# Patient Record
Sex: MALE | Race: WHITE | Employment: OTHER | URBAN - METROPOLITAN AREA
[De-identification: names, ages, dates, MRNs, and addresses within clinical notes are randomized per-mention and may not be internally consistent; named-entity substitution may affect disease eponyms.]

---

## 2018-12-06 ENCOUNTER — LAB ENCOUNTER (OUTPATIENT)
Dept: LAB | Facility: HOSPITAL | Age: 83
End: 2018-12-06
Payer: MEDICARE

## 2018-12-06 DIAGNOSIS — E78.00 HYPERCHOLESTEROLEMIA: ICD-10-CM

## 2018-12-06 DIAGNOSIS — G47.00 INSOMNIA: ICD-10-CM

## 2018-12-06 DIAGNOSIS — R41.3 MEMORY IMPAIRMENT: ICD-10-CM

## 2018-12-06 DIAGNOSIS — R73.9 HYPERGLYCEMIA: Primary | ICD-10-CM

## 2018-12-06 DIAGNOSIS — H91.90 HEARING LOSS: ICD-10-CM

## 2018-12-06 DIAGNOSIS — E03.9 HYPOTHYROIDISM: ICD-10-CM

## 2018-12-06 DIAGNOSIS — I10 HYPERTENSION: ICD-10-CM

## 2018-12-06 DIAGNOSIS — F03.90 DEMENTIA (HCC): ICD-10-CM

## 2018-12-06 DIAGNOSIS — E53.8 B12 DEFICIENCY: ICD-10-CM

## 2018-12-06 DIAGNOSIS — E55.9 VITAMIN D DEFICIENCY: ICD-10-CM

## 2018-12-06 PROCEDURE — 36415 COLL VENOUS BLD VENIPUNCTURE: CPT

## 2018-12-06 PROCEDURE — 82306 VITAMIN D 25 HYDROXY: CPT

## 2018-12-06 PROCEDURE — 80053 COMPREHEN METABOLIC PANEL: CPT

## 2018-12-06 PROCEDURE — 80061 LIPID PANEL: CPT

## 2018-12-06 PROCEDURE — 85025 COMPLETE CBC W/AUTO DIFF WBC: CPT

## 2018-12-06 PROCEDURE — 84443 ASSAY THYROID STIM HORMONE: CPT

## 2018-12-06 PROCEDURE — 82607 VITAMIN B-12: CPT

## 2018-12-06 PROCEDURE — 82746 ASSAY OF FOLIC ACID SERUM: CPT

## 2018-12-06 PROCEDURE — 83036 HEMOGLOBIN GLYCOSYLATED A1C: CPT

## 2020-09-10 ENCOUNTER — LAB ENCOUNTER (OUTPATIENT)
Dept: LAB | Facility: HOSPITAL | Age: 85
End: 2020-09-10
Payer: MEDICARE

## 2020-09-10 DIAGNOSIS — R63.4 WEIGHT LOSS: ICD-10-CM

## 2020-09-10 DIAGNOSIS — I50.9 CHF (CONGESTIVE HEART FAILURE) (HCC): ICD-10-CM

## 2020-09-10 DIAGNOSIS — I10 HTN (HYPERTENSION): ICD-10-CM

## 2020-09-10 DIAGNOSIS — L85.9 HLP (HYPERKERATOSIS LENTICULARIS PERSTANS): ICD-10-CM

## 2020-09-10 DIAGNOSIS — E03.9 HYPOTHYROIDISM: Primary | ICD-10-CM

## 2020-09-10 DIAGNOSIS — F03.90 DEMENTIA (HCC): ICD-10-CM

## 2020-09-10 DIAGNOSIS — E55.9 VITAMIN D DEFICIENCY: ICD-10-CM

## 2020-09-10 DIAGNOSIS — E53.8 B12 DEFICIENCY: ICD-10-CM

## 2020-09-10 LAB
ALBUMIN SERPL-MCNC: 3.5 G/DL (ref 3.4–5)
ALBUMIN/GLOB SERPL: 1 {RATIO} (ref 1–2)
ALP LIVER SERPL-CCNC: 69 U/L (ref 45–117)
ALT SERPL-CCNC: 18 U/L (ref 16–61)
ANION GAP SERPL CALC-SCNC: 7 MMOL/L (ref 0–18)
AST SERPL-CCNC: 12 U/L (ref 15–37)
BASOPHILS # BLD AUTO: 0.05 X10(3) UL (ref 0–0.2)
BASOPHILS NFR BLD AUTO: 1 %
BILIRUB SERPL-MCNC: 0.8 MG/DL (ref 0.1–2)
BUN BLD-MCNC: 19 MG/DL (ref 7–18)
BUN/CREAT SERPL: 13.5 (ref 10–20)
CALCIUM BLD-MCNC: 9.2 MG/DL (ref 8.5–10.1)
CHLORIDE SERPL-SCNC: 108 MMOL/L (ref 98–112)
CHOLEST SMN-MCNC: 151 MG/DL (ref ?–200)
CO2 SERPL-SCNC: 24 MMOL/L (ref 21–32)
CREAT BLD-MCNC: 1.41 MG/DL (ref 0.7–1.3)
DEPRECATED RDW RBC AUTO: 52.9 FL (ref 35.1–46.3)
EOSINOPHIL # BLD AUTO: 0.2 X10(3) UL (ref 0–0.7)
EOSINOPHIL NFR BLD AUTO: 3.9 %
ERYTHROCYTE [DISTWIDTH] IN BLOOD BY AUTOMATED COUNT: 15.5 % (ref 11–15)
GLOBULIN PLAS-MCNC: 3.5 G/DL (ref 2.8–4.4)
GLUCOSE BLD-MCNC: 106 MG/DL (ref 70–99)
HCT VFR BLD AUTO: 44.1 % (ref 39–53)
HDLC SERPL-MCNC: 50 MG/DL (ref 40–59)
HGB BLD-MCNC: 14.4 G/DL (ref 13–17.5)
IMM GRANULOCYTES # BLD AUTO: 0.01 X10(3) UL (ref 0–1)
IMM GRANULOCYTES NFR BLD: 0.2 %
LDLC SERPL CALC-MCNC: 86 MG/DL (ref ?–100)
LYMPHOCYTES # BLD AUTO: 2.06 X10(3) UL (ref 1–4)
LYMPHOCYTES NFR BLD AUTO: 39.7 %
M PROTEIN MFR SERPL ELPH: 7 G/DL (ref 6.4–8.2)
MCH RBC QN AUTO: 30.8 PG (ref 26–34)
MCHC RBC AUTO-ENTMCNC: 32.7 G/DL (ref 31–37)
MCV RBC AUTO: 94.2 FL (ref 80–100)
MONOCYTES # BLD AUTO: 0.3 X10(3) UL (ref 0.1–1)
MONOCYTES NFR BLD AUTO: 5.8 %
NEUTROPHILS # BLD AUTO: 2.57 X10 (3) UL (ref 1.5–7.7)
NEUTROPHILS # BLD AUTO: 2.57 X10(3) UL (ref 1.5–7.7)
NEUTROPHILS NFR BLD AUTO: 49.4 %
NONHDLC SERPL-MCNC: 101 MG/DL (ref ?–130)
NT-PROBNP SERPL-MCNC: 413 PG/ML (ref ?–450)
OSMOLALITY SERPL CALC.SUM OF ELEC: 291 MOSM/KG (ref 275–295)
PATIENT FASTING Y/N/NP: YES
PATIENT FASTING Y/N/NP: YES
PLATELET # BLD AUTO: 190 10(3)UL (ref 150–450)
POTASSIUM SERPL-SCNC: 4 MMOL/L (ref 3.5–5.1)
RBC # BLD AUTO: 4.68 X10(6)UL (ref 3.8–5.8)
SODIUM SERPL-SCNC: 139 MMOL/L (ref 136–145)
T3 SERPL-MCNC: 52 NG/DL (ref 60–181)
T4 SERPL-MCNC: 9.5 UG/DL (ref 4.5–12.1)
TRIGL SERPL-MCNC: 75 MG/DL (ref 30–149)
TSI SER-ACNC: 2.87 MIU/ML (ref 0.36–3.74)
VIT B12 SERPL-MCNC: 978 PG/ML (ref 193–986)
VLDLC SERPL CALC-MCNC: 15 MG/DL (ref 0–30)
WBC # BLD AUTO: 5.2 X10(3) UL (ref 4–11)

## 2020-09-10 PROCEDURE — 83880 ASSAY OF NATRIURETIC PEPTIDE: CPT

## 2020-09-10 PROCEDURE — 82607 VITAMIN B-12: CPT

## 2020-09-10 PROCEDURE — 84480 ASSAY TRIIODOTHYRONINE (T3): CPT

## 2020-09-10 PROCEDURE — 84443 ASSAY THYROID STIM HORMONE: CPT

## 2020-09-10 PROCEDURE — 82306 VITAMIN D 25 HYDROXY: CPT

## 2020-09-10 PROCEDURE — 80053 COMPREHEN METABOLIC PANEL: CPT

## 2020-09-10 PROCEDURE — 80061 LIPID PANEL: CPT

## 2020-09-10 PROCEDURE — 36415 COLL VENOUS BLD VENIPUNCTURE: CPT

## 2020-09-10 PROCEDURE — 84436 ASSAY OF TOTAL THYROXINE: CPT

## 2020-09-10 PROCEDURE — 85025 COMPLETE CBC W/AUTO DIFF WBC: CPT

## 2020-09-11 LAB — 25(OH)D3 SERPL-MCNC: 28.9 NG/ML (ref 30–100)

## 2021-08-28 ENCOUNTER — APPOINTMENT (OUTPATIENT)
Dept: CT IMAGING | Facility: HOSPITAL | Age: 86
DRG: 087 | End: 2021-08-28
Attending: EMERGENCY MEDICINE
Payer: MEDICARE

## 2021-08-28 ENCOUNTER — HOSPITAL ENCOUNTER (INPATIENT)
Facility: HOSPITAL | Age: 86
LOS: 3 days | Discharge: NURSING, REHAB OR LONG-TERM CARE | DRG: 087 | End: 2021-08-31
Attending: EMERGENCY MEDICINE | Admitting: HOSPITALIST
Payer: MEDICARE

## 2021-08-28 ENCOUNTER — APPOINTMENT (OUTPATIENT)
Dept: GENERAL RADIOLOGY | Facility: HOSPITAL | Age: 86
DRG: 087 | End: 2021-08-28
Attending: EMERGENCY MEDICINE
Payer: MEDICARE

## 2021-08-28 DIAGNOSIS — S06.340A TRAUMATIC HEMORRHAGE OF RIGHT CEREBRUM WITHOUT LOSS OF CONSCIOUSNESS, INITIAL ENCOUNTER (HCC): ICD-10-CM

## 2021-08-28 DIAGNOSIS — S02.2XXA CLOSED FRACTURE OF NASAL BONE, INITIAL ENCOUNTER: ICD-10-CM

## 2021-08-28 DIAGNOSIS — S51.802A: ICD-10-CM

## 2021-08-28 DIAGNOSIS — I60.9 SUBARACHNOID HEMORRHAGE (HCC): ICD-10-CM
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
## 2021-08-28 LAB
ANION GAP SERPL CALC-SCNC: 4 MMOL/L (ref 0–18)
ANTIBODY SCREEN: NEGATIVE
BASOPHILS # BLD AUTO: 0.06 X10(3) UL (ref 0–0.2)
BASOPHILS NFR BLD AUTO: 0.7 %
BUN BLD-MCNC: 28 MG/DL (ref 7–18)
BUN/CREAT SERPL: 22.2 (ref 10–20)
CALCIUM BLD-MCNC: 8.6 MG/DL (ref 8.5–10.1)
CHLORIDE SERPL-SCNC: 112 MMOL/L (ref 98–112)
CO2 SERPL-SCNC: 28 MMOL/L (ref 21–32)
CREAT BLD-MCNC: 1.26 MG/DL
DEPRECATED RDW RBC AUTO: 51.8 FL (ref 35.1–46.3)
EOSINOPHIL # BLD AUTO: 0.36 X10(3) UL (ref 0–0.7)
EOSINOPHIL NFR BLD AUTO: 4.4 %
ERYTHROCYTE [DISTWIDTH] IN BLOOD BY AUTOMATED COUNT: 15.2 % (ref 11–15)
GLUCOSE BLD-MCNC: 86 MG/DL (ref 70–99)
HAV IGM SER QL: 2.2 MG/DL (ref 1.6–2.6)
HCT VFR BLD AUTO: 39.2 %
HGB BLD-MCNC: 12.9 G/DL
IMM GRANULOCYTES # BLD AUTO: 0.03 X10(3) UL (ref 0–1)
IMM GRANULOCYTES NFR BLD: 0.4 %
LYMPHOCYTES # BLD AUTO: 1.62 X10(3) UL (ref 1–4)
LYMPHOCYTES NFR BLD AUTO: 19.9 %
MCH RBC QN AUTO: 30.4 PG (ref 26–34)
MCHC RBC AUTO-ENTMCNC: 32.9 G/DL (ref 31–37)
MCV RBC AUTO: 92.5 FL
MONOCYTES # BLD AUTO: 0.59 X10(3) UL (ref 0.1–1)
MONOCYTES NFR BLD AUTO: 7.3 %
NEUTROPHILS # BLD AUTO: 5.47 X10 (3) UL (ref 1.5–7.7)
NEUTROPHILS # BLD AUTO: 5.47 X10(3) UL (ref 1.5–7.7)
NEUTROPHILS NFR BLD AUTO: 67.3 %
OSMOLALITY SERPL CALC.SUM OF ELEC: 303 MOSM/KG (ref 275–295)
PLATELET # BLD AUTO: 169 10(3)UL (ref 150–450)
POTASSIUM SERPL-SCNC: 4.3 MMOL/L (ref 3.5–5.1)
RBC # BLD AUTO: 4.24 X10(6)UL
RH BLOOD TYPE: POSITIVE
SARS-COV-2 RNA RESP QL NAA+PROBE: NOT DETECTED
SODIUM SERPL-SCNC: 144 MMOL/L (ref 136–145)
T4 FREE SERPL-MCNC: 1 NG/DL (ref 0.8–1.7)
TSI SER-ACNC: 4.33 MIU/ML (ref 0.36–3.74)
WBC # BLD AUTO: 8.1 X10(3) UL (ref 4–11)

## 2021-08-28 PROCEDURE — 70450 CT HEAD/BRAIN W/O DYE: CPT | Performed by: EMERGENCY MEDICINE

## 2021-08-28 PROCEDURE — 71045 X-RAY EXAM CHEST 1 VIEW: CPT | Performed by: EMERGENCY MEDICINE

## 2021-08-28 PROCEDURE — 72125 CT NECK SPINE W/O DYE: CPT | Performed by: EMERGENCY MEDICINE

## 2021-08-28 PROCEDURE — 73090 X-RAY EXAM OF FOREARM: CPT | Performed by: EMERGENCY MEDICINE

## 2021-08-28 PROCEDURE — 70486 CT MAXILLOFACIAL W/O DYE: CPT | Performed by: EMERGENCY MEDICINE

## 2021-08-28 PROCEDURE — 99223 1ST HOSP IP/OBS HIGH 75: CPT | Performed by: HOSPITALIST

## 2021-08-28 RX ORDER — DEXTROSE AND SODIUM CHLORIDE 5; .45 G/100ML; G/100ML
INJECTION, SOLUTION INTRAVENOUS ONCE
Status: COMPLETED | OUTPATIENT
Start: 2021-08-28 | End: 2021-08-29

## 2021-08-28 NOTE — ED PROVIDER NOTES
Patient Seen in: [facility] AND [facility] Emergency Department    History   Patient presents with:  Fall  Laceration/Abrasion    Stated Complaint: FALL/ LAC TO FACE     HPI    61-year-old male with past medical history of dementia, hypothyroid, HTN, CAD on ASA pr superficial avulsion without associated bony tenderness. Neurological: Alert. Moving all extremities equally. Skin: Skin is warm. Psychiatric: Cooperative. Nursing note and vitals reviewed.         ED Course     Labs Reviewed   CBC W/ DIFFERENTIAL - Ab osteoarthritis. SOFT TISSUES: Tiny calcifications and/or debris that project along the dorsal soft tissues at the level of the proximal ulna with mild associated soft tissue swelling. EFFUSION: None visible. OTHER: Negative. CONCLUSION:  1.  No radi acute-appearing segmental fracture deformity involving the nasal septum. Associated presumed hemorrhagic debris throughout the nasal cavity and nasopharynx.   Multifocal paranasal sinus mucosal thickening, which is most pronounced in the right maxillary si SINUSES: Multifocal paranasal sinus mucosal thickening, which is most pronounced in the ethmoid and right maxillary sinuses. FACIAL BONES: The zygomaticomaxillary complexes are intact bilaterally. The zygomatic arches are uninterrupted.  No fractures of the 7:11 PM     Finalized by (CST): Yaa Garcia MD on 8/28/2021 at 7:16 PM          CT SPINE CERVICAL (CPT=72125)    Result Date: 8/28/2021  PROCEDURE: CT SPINE CERVICAL (CPT=72125)  COMPARISON: None.   INDICATIONS: S/p fall, laceration to nose, h/o dementi enlarged. There is mild tortuosity of the thoracic aorta with peripheral atherosclerotic vascular calcification. The pulmonary vascularity is prominent centrally. MEDIAST/NIKO: No visible mass or adenopathy.  LUNGS/PLEURA: No airspace consolidation, pleural intracerebral contusion, facial fracture.     Pulse ox: 97%:Normal on RA, as interpreted by myself    Cardiac Monitor Interpretation: Pulse Readings from Last 1 Encounters:  08/28/21 : 58  , sinus,      Evaluation for mechanical fall with secondary facial i Clinical Impression:  Accidental fall, initial encounter  (primary encounter diagnosis)  Traumatic hemorrhage of right cerebrum without loss of consciousness, initial encounter ([facility] [address]  Closed fracture of nasal bone, initial encounter  Subarachnoid hemor

## 2021-08-28 NOTE — ED INITIAL ASSESSMENT (HPI)
Patient is from [address] [place] [address],[facility], he was running down the kenny when the nurse witnessed a mechanical fall. Patient with laceration to his nose, and his dentures cut through his lip. Patient is on ASA. No LOC, Denies pain.  EMS attempted to place C-col

## 2021-08-29 ENCOUNTER — APPOINTMENT (OUTPATIENT)
Dept: CT IMAGING | Facility: HOSPITAL | Age: 86
DRG: 087 | End: 2021-08-29
Attending: HOSPITALIST
Payer: MEDICARE

## 2021-08-29 ENCOUNTER — APPOINTMENT (OUTPATIENT)
Dept: CT IMAGING | Facility: HOSPITAL | Age: 86
DRG: 087 | End: 2021-08-29
Attending: EMERGENCY MEDICINE
Payer: MEDICARE

## 2021-08-29 LAB
ANION GAP SERPL CALC-SCNC: 5 MMOL/L (ref 0–18)
BASOPHILS # BLD AUTO: 0.03 X10(3) UL (ref 0–0.2)
BASOPHILS NFR BLD AUTO: 0.4 %
BILIRUB UR QL: NEGATIVE
BUN BLD-MCNC: 21 MG/DL (ref 7–18)
BUN/CREAT SERPL: 19.4 (ref 10–20)
CALCIUM BLD-MCNC: 8.3 MG/DL (ref 8.5–10.1)
CHLORIDE SERPL-SCNC: 112 MMOL/L (ref 98–112)
CLARITY UR: CLEAR
CO2 SERPL-SCNC: 25 MMOL/L (ref 21–32)
COLOR UR: YELLOW
CREAT BLD-MCNC: 1.08 MG/DL
DEPRECATED RDW RBC AUTO: 52.2 FL (ref 35.1–46.3)
EOSINOPHIL # BLD AUTO: 0.27 X10(3) UL (ref 0–0.7)
EOSINOPHIL NFR BLD AUTO: 3.7 %
ERYTHROCYTE [DISTWIDTH] IN BLOOD BY AUTOMATED COUNT: 15.2 % (ref 11–15)
GLUCOSE BLD-MCNC: 101 MG/DL (ref 70–99)
GLUCOSE UR-MCNC: NEGATIVE MG/DL
HCT VFR BLD AUTO: 36 %
HGB BLD-MCNC: 11.8 G/DL
IMM GRANULOCYTES # BLD AUTO: 0.02 X10(3) UL (ref 0–1)
IMM GRANULOCYTES NFR BLD: 0.3 %
KETONES UR-MCNC: NEGATIVE MG/DL
LEUKOCYTE ESTERASE UR QL STRIP.AUTO: NEGATIVE
LYMPHOCYTES # BLD AUTO: 1.35 X10(3) UL (ref 1–4)
LYMPHOCYTES NFR BLD AUTO: 18.4 %
MCH RBC QN AUTO: 30.6 PG (ref 26–34)
MCHC RBC AUTO-ENTMCNC: 32.8 G/DL (ref 31–37)
MCV RBC AUTO: 93.5 FL
MONOCYTES # BLD AUTO: 0.71 X10(3) UL (ref 0.1–1)
MONOCYTES NFR BLD AUTO: 9.7 %
NEUTROPHILS # BLD AUTO: 4.97 X10 (3) UL (ref 1.5–7.7)
NEUTROPHILS # BLD AUTO: 4.97 X10(3) UL (ref 1.5–7.7)
NEUTROPHILS NFR BLD AUTO: 67.5 %
NITRITE UR QL STRIP.AUTO: NEGATIVE
OSMOLALITY SERPL CALC.SUM OF ELEC: 297 MOSM/KG (ref 275–295)
PH UR: 6 [PH] (ref 5–8)
PLATELET # BLD AUTO: 177 10(3)UL (ref 150–450)
POTASSIUM SERPL-SCNC: 4 MMOL/L (ref 3.5–5.1)
PROT UR-MCNC: NEGATIVE MG/DL
RBC # BLD AUTO: 3.85 X10(6)UL
RBC #/AREA URNS AUTO: >10 /HPF
SODIUM SERPL-SCNC: 142 MMOL/L (ref 136–145)
SP GR UR STRIP: 1.02 (ref 1–1.03)
UROBILINOGEN UR STRIP-ACNC: <2
WBC # BLD AUTO: 7.4 X10(3) UL (ref 4–11)

## 2021-08-29 PROCEDURE — 99233 SBSQ HOSP IP/OBS HIGH 50: CPT | Performed by: HOSPITALIST

## 2021-08-29 PROCEDURE — 70450 CT HEAD/BRAIN W/O DYE: CPT | Performed by: HOSPITALIST

## 2021-08-29 RX ORDER — QUETIAPINE 25 MG/1
25 TABLET, FILM COATED ORAL NIGHTLY
Status: DISCONTINUED | OUTPATIENT
Start: 2021-08-29 | End: 2021-08-31

## 2021-08-29 RX ORDER — QUETIAPINE 25 MG/1
25 TABLET, FILM COATED ORAL NIGHTLY
COMMUNITY

## 2021-08-29 RX ORDER — LEVOTHYROXINE SODIUM 0.07 MG/1
75 TABLET ORAL
COMMUNITY

## 2021-08-29 RX ORDER — MIRTAZAPINE 7.5 MG/1
7.5 TABLET, FILM COATED ORAL NIGHTLY
COMMUNITY

## 2021-08-29 RX ORDER — DONEPEZIL HYDROCHLORIDE 10 MG/1
10 TABLET, FILM COATED ORAL NIGHTLY
Status: DISCONTINUED | OUTPATIENT
Start: 2021-08-29 | End: 2021-08-31

## 2021-08-29 RX ORDER — FUROSEMIDE 20 MG/1
20 TABLET ORAL DAILY PRN
COMMUNITY

## 2021-08-29 RX ORDER — DONEPEZIL HYDROCHLORIDE 10 MG/1
10 TABLET, FILM COATED ORAL NIGHTLY
COMMUNITY

## 2021-08-29 RX ORDER — METOPROLOL SUCCINATE 50 MG/1
50 TABLET, EXTENDED RELEASE ORAL DAILY
Status: ON HOLD | COMMUNITY
End: 2021-08-31

## 2021-08-29 RX ORDER — DEXTROSE, SODIUM CHLORIDE, AND POTASSIUM CHLORIDE 5; .45; .15 G/100ML; G/100ML; G/100ML
INJECTION INTRAVENOUS CONTINUOUS
Status: DISCONTINUED | OUTPATIENT
Start: 2021-08-29 | End: 2021-08-30

## 2021-08-29 RX ORDER — ONDANSETRON 2 MG/ML
4 INJECTION INTRAMUSCULAR; INTRAVENOUS EVERY 6 HOURS PRN
Status: DISCONTINUED | OUTPATIENT
Start: 2021-08-29 | End: 2021-08-31

## 2021-08-29 RX ORDER — ACETAMINOPHEN 325 MG/1
650 TABLET ORAL EVERY 6 HOURS PRN
COMMUNITY

## 2021-08-29 RX ORDER — MIRTAZAPINE 7.5 MG/1
7.5 TABLET, FILM COATED ORAL NIGHTLY
Status: DISCONTINUED | OUTPATIENT
Start: 2021-08-29 | End: 2021-08-31

## 2021-08-29 RX ORDER — ASPIRIN 81 MG/1
81 TABLET, CHEWABLE ORAL DAILY
Status: ON HOLD | COMMUNITY
End: 2021-08-31

## 2021-08-29 RX ORDER — LEVOTHYROXINE SODIUM 0.07 MG/1
75 TABLET ORAL
Status: DISCONTINUED | OUTPATIENT
Start: 2021-08-29 | End: 2021-08-31

## 2021-08-29 RX ORDER — CLOTRIMAZOLE 1 %
1 CREAM (GRAM) TOPICAL 2 TIMES DAILY
COMMUNITY

## 2021-08-29 RX ORDER — ACETAMINOPHEN 325 MG/1
650 TABLET ORAL EVERY 6 HOURS PRN
Status: DISCONTINUED | OUTPATIENT
Start: 2021-08-29 | End: 2021-08-31

## 2021-08-29 RX ORDER — DOXEPIN HYDROCHLORIDE 50 MG/1
1 CAPSULE ORAL DAILY
COMMUNITY

## 2021-08-29 RX ORDER — HYDRALAZINE HYDROCHLORIDE 20 MG/ML
10 INJECTION INTRAMUSCULAR; INTRAVENOUS EVERY 4 HOURS PRN
Status: DISCONTINUED | OUTPATIENT
Start: 2021-08-29 | End: 2021-08-31

## 2021-08-29 RX ORDER — LABETALOL HYDROCHLORIDE 5 MG/ML
10 INJECTION, SOLUTION INTRAVENOUS EVERY 4 HOURS PRN
Status: DISCONTINUED | OUTPATIENT
Start: 2021-08-29 | End: 2021-08-31

## 2021-08-29 RX ORDER — PANTOPRAZOLE SODIUM 40 MG/1
40 TABLET, DELAYED RELEASE ORAL
Status: DISCONTINUED | OUTPATIENT
Start: 2021-08-29 | End: 2021-08-31

## 2021-08-29 RX ORDER — HYDROCODONE BITARTRATE AND ACETAMINOPHEN 5; 325 MG/1; MG/1
1 TABLET ORAL EVERY 6 HOURS PRN
Status: DISCONTINUED | OUTPATIENT
Start: 2021-08-29 | End: 2021-08-31

## 2021-08-29 NOTE — BH PROGRESS NOTE
X3 attempts made to contact [facility] regarding pt's paperwork to complete admission. Informed to call back at 0830 when on-duty RN is available.

## 2021-08-29 NOTE — CONSULTS
Full neurosurgery consult dictated.     /75   Pulse (!) 45   Temp 97.9 °F (36.6 °C) (Temporal)   Resp 18   Ht 5' 9\" (1.753 m)   Wt 180 lb (81.6 kg)   SpO2 96%   BMI 26.58 kg/m²     Mechanical fall with tiny right frontal and left parietal contusions/

## 2021-08-29 NOTE — PROGRESS NOTES
Received patient from ED s/p fall, laceration to nasal bridge and L forearm (with steri-strips). Not oriented but is alert, frequently confused and impulsive. Sensitive to touch and became agitated but easily calmed with distraction/reorientation.   Workin

## 2021-08-29 NOTE — PROGRESS NOTES
[facility]    Progress Note    Etienne Correa Patient Status:  Inpatient    1935 MRN [id number]   Location [facility] 2W/SW Attending Linn Rincon MD   Hosp Day # 1 PCP Pasquale Prieto     CC: fall SAH  Subjective RDW 15.2* 15.2*   NEPRELIM 5.47 4.97   WBC 8.1 7.4   .0 177.0     Recent Labs   Lab 08/28/21 1952 08/29/21  0449   GLU 86 101*   BUN 28* 21*   CREATSERUM 1.26 1.08   GFRAA 61 73   GFRNAA 52* 63   CA 8.6 8.3*    142   K 4.3 4.0    112 frontal lobe, which may relate to sequelae of remote trauma or infarction. 4. Nonspecific white matter changes involving both cerebral hemispheres that most likely reflect sequelae of chronic microangiopathy. 5. Intracranial atherosclerosis.   Results of th airspace disease or significant pleural effusion. 2. Borderline cardiomegaly. Mild nonspecific interstitial prominence, which may relate to senescent change or low-grade interstitial edema.     Dictated by (CST): Micheal Whatley MD on 8/28/2021 at 8:01 PM

## 2021-08-29 NOTE — H&P
[facility] Patient Status:  Inpatient    1935 MRN [id number]   Location [facility] 2W/SW Attending Ranjith Blanchard MD   Hosp Day # 1 PCP 1111 RYAN Prieto     Date:  2021  D non-distended, normal bowel sounds, no organomegaly. Lymphatics:  No lymphadenopathy neck, axilla, groin. Musculoskeletal: Normal range of motion. normal strength. Feet:  Normal pulses.   Neurologic:  no focal deficits, cranial nerves II-XII are grossly matter changes involving both cerebral hemispheres that most likely reflect sequelae of chronic microangiopathy. 5. Intracranial atherosclerosis.   Results of this examination were discussed with the patient's physician, Dr. Carrol Cornell, by Dr. Keren Dixon at 9609 on interstitial prominence, which may relate to senescent change or low-grade interstitial edema.     Dictated by (CST): Jeffery Coley MD on 8/28/2021 at 8:01 PM     Finalized by (CST): Jeffery Coley MD on 8/28/2021 at 8:03 PM             Assessment and Pl

## 2021-08-29 NOTE — PROGRESS NOTES
[facility] Pharmacy Note:  Renal Adjustment for ampicillin/sulbactam (UNASYN)    Arik Metcalf is a 82year old patient who has been prescribed ampicillin/sulbactam (UNASYN) 3000 mg every 12 hrs.   The estimated creatinine clearance is 51.8 mL/min (based on SCr o

## 2021-08-29 NOTE — CONSULTS
[facility]  Cardiology Consultation    Cruzning Mojica Patient Status:  Inpatient    1935 MRN [id number]   Location [facility] 2W/ Attending Rashaun Marrero MD   Hosp Day # 1 PCP Pasquale Prieto     Reason for Consultation 13   Ht 5' 9\" (1.753 m)   Wt 180 lb (81.6 kg)   SpO2 95%   BMI 26.58 kg/m²   Temp (24hrs), Av.3 °F (36.8 °C), Min:97.9 °F (36.6 °C), Max:99.2 °F (37.3 °C)       Intake/Output Summary (Last 24 hours) at 2021 1218  Last data filed at 2021 1204

## 2021-08-29 NOTE — SLP NOTE
ADULT SWALLOWING EVALUATION    ASSESSMENT    ASSESSMENT/OVERALL IMPRESSION:  PPE: DROPLET MASK, GOGGLES AND GLOVES. HAND SANITIZED UPON ENTRANCE/EXIT. SLP BSSE orders received and acknowledged.  A swallow evaluation warranted as pt s/p fall with R front recommendations.   FCM SCORE: 2/7     RECOMMENDATIONS   Diet Recommendations - Solids: NPO  Diet Recommendations - Liquids: NPO     Aspiration Precautions: Upright position  Medication Administration Recommendations: Non-oral (NECESSARY MEDS ONLY - CRUSHED PM         OBJECTIVE   ORAL MOTOR EXAMINATION  Dentition: Edentulous  Symmetry: Unable to assess; Reduced right facial  Strength: Unable to assess  Tone: Unable to assess  Range of Motion: Unable to assess  Rate of Motion: Unable to assess    Voice Quality:

## 2021-08-30 ENCOUNTER — APPOINTMENT (OUTPATIENT)
Dept: CV DIAGNOSTICS | Facility: HOSPITAL | Age: 86
DRG: 087 | End: 2021-08-30
Attending: HOSPITALIST
Payer: MEDICARE

## 2021-08-30 LAB — BLOOD TYPE BARCODE: 6200

## 2021-08-30 PROCEDURE — 93306 TTE W/DOPPLER COMPLETE: CPT | Performed by: HOSPITALIST

## 2021-08-30 PROCEDURE — 99221 1ST HOSP IP/OBS SF/LOW 40: CPT | Performed by: OTOLARYNGOLOGY

## 2021-08-30 PROCEDURE — 99233 SBSQ HOSP IP/OBS HIGH 50: CPT | Performed by: HOSPITALIST

## 2021-08-30 RX ORDER — HALOPERIDOL 5 MG/ML
1 INJECTION INTRAMUSCULAR EVERY 6 HOURS PRN
Status: DISCONTINUED | OUTPATIENT
Start: 2021-08-30 | End: 2021-08-31

## 2021-08-30 NOTE — PROGRESS NOTES
[address] PROGRESS NOTE      Genia Douglass Patient Status:  Inpatient    1935 MRN [id number]   Location [facility] 2W/SW Attending Jessica Hawkins MD   [facility] Day # 2 PCP 1111 RYAN Prieto         Assessment an 08/29/2021    ALB 3.5 05/06/2021    ALKPHO 85 05/06/2021    BILT 0.4 05/06/2021    TP 7.7 05/06/2021    AST 15 05/06/2021    ALT 26 05/06/2021    T4F 1.0 08/28/2021    TSH 4.330 (H) 08/28/2021    MG 2.2 08/28/2021    B12 696 05/06/2021       XR FOREARM (2 hemispheres that most likely reflect sequelae of chronic microangiopathy. 5. Intracranial atherosclerosis. Results of this examination were discussed with the patient's physician, Dr. Neal Vega, by Dr. Rashawn Ayala at [place] on 08/28/2021.    Dictated by (CST): Rashawn Ayala, relate to senescent change or low-grade interstitial edema.     Dictated by (CST): Uzma Roblero MD on 8/28/2021 at 8:01 PM     Finalized by (CST): Uzma Roblero MD on 8/28/2021 at 8:03 PM          EKG 12 Lead    Result Date: 8/29/2021  ECG Report  Inte

## 2021-08-30 NOTE — CM/SW NOTE
08/30/21 1400   CM/SW Referral Data   Referral Source    Reason for Referral Discharge planning   Informant EMR   Patient Info   Patient's Current Mental Status at Time of Assessment Confused or unable to complete assessment   Patient's Home

## 2021-08-30 NOTE — CM/SW NOTE
Received call from Dr. Maik Garcia - lamberto spoke w/ pt's dtr in pt's room. Per MD, family agreeable to SNF at [place] and are requesting [facility] SNF. SW discussed w/ MD about PT/OT rec for [place] at [facility].  Per Dr. Maik Garcia - lamberto and dtr feel pt may benefit from some

## 2021-08-30 NOTE — PLAN OF CARE
Received patient at 1900. Patient became agitated and restless around 2000 but was easily redirected, patient  is now more cooperative with care and relaxed. Patient awake and compliant with plan of care this morning, patient in bed with bed alarm on.  Will

## 2021-08-30 NOTE — PLAN OF CARE
Patient AxOx1, very [place]. Facial bruising/injury/trauma present s/p fall. Neuro checks q4. PT/OT/SLP eval this morning. Plans to transfer to cardiac unit today and discharge back to nursing home when medically ready.  Report was given to Ezio Sun, patient t

## 2021-08-30 NOTE — SLP NOTE
ADULT SWALLOWING EVALUATION    ASSESSMENT    ASSESSMENT/OVERALL IMPRESSION:  PPE REQUIRED. THIS THERAPIST WORE GLOVES, DROPLET MASK, AND GOGGLES FOR DURATION OF EVALUATION. HANDS WASHED UPON ENTRANCE/EXIT.     A repeat swallow evaluation warranted as pt rem straws; Slow rate; Alternate consistencies;Small bites and sips;Multiple swallows  Aspiration Precautions: Upright position; Slow rate;Small bites and sips; No straw  Medication Administration Recommendations: Crushed in puree  Treatment Plan/Recommendations: EXAMINATION  Dentition: Edentulous  Symmetry: Unable to assess; Reduced right facial  Strength: Unable to assess  Tone: Unable to assess  Range of Motion: Unable to assess  Rate of Motion: Unable to assess    Voice Quality: Clear  Respiratory Status: Desmond Ramos precautions  Number of Visits to Meet Established Goals: 3  Follow Up Needed (Documentation Required): Yes  SLP Follow-up Date: 08/31/21    Thank you for your referral.   If you have any questions, please contact CHARMAINE Meyers M.S. [id number] [facility]

## 2021-08-30 NOTE — PLAN OF CARE
Double RN skin check done prior to transfer off Unit. Skin check performed by this RN and Carol Mercado RN. Wounds are as followed: Nose laceration, lip laceration, left forearm laceration, friction/shear to sacrum.     Will remain available for any further ques

## 2021-08-30 NOTE — PHYSICAL THERAPY NOTE
PHYSICAL THERAPY EVALUATION - INPATIENT     Room Number: 206/206-A  Evaluation Date: 8/30/2021  Type of Evaluation: Initial   Physician Order: PT Eval and Treat    Presenting Problem: fall at home; tiny R frontal & L parietal contusions/subarachnoid hemmo cooperative and able to follow one step simple commands w/ cueing. Patient will benefit from continued IP PT services to address these deficits in preparation for discharge.     DISCHARGE RECOMMENDATIONS  PT Discharge Recommendations:  (Return to 24/7 ca OBJECTIVE  Precautions: Bed/chair alarm  Fall Risk: High fall risk    WEIGHT BEARING RESTRICTION                   PAIN ASSESSMENT             COGNITION  · Overall Cognitive Status:  Impaired  · Following Commands:  follows one step commands with incre (HHA)     Stoop/Curb Assistance: Not tested       Bed Mobility: Mod A x 1    Transfers: Min A    Exercise/Education Provided:  Bed mobility  Body mechanics  Energy conservation  Gait training  Transfer training    Patient End of Session: Up in chair;Needs

## 2021-08-30 NOTE — PROGRESS NOTES
[place] [facility] - [facility]    Progress Note    Preston Ramsey Patient Status:  Inpatient    1935 MRN [id number]   Location [facility] 2W/SW Attending Desi Major MD   Hosp Day # 2 PCP Pasquale Prieto     CC: fall SAH  Subjective RDW 15.2* 15.2*   NEPRELIM 5.47 4.97   WBC 8.1 7.4   .0 177.0     Recent Labs   Lab 08/28/21 1952 08/29/21  0449   GLU 86 101*   BUN 28* 21*   CREATSERUM 1.26 1.08   GFRAA 61 73   GFRNAA 52* 63   CA 8.6 8.3*    142   K 4.3 4.0    112 frontal lobe, which may relate to sequelae of remote trauma or infarction. 4. Nonspecific white matter changes involving both cerebral hemispheres that most likely reflect sequelae of chronic microangiopathy. 5. Intracranial atherosclerosis.   Results of th airspace disease or significant pleural effusion. 2. Borderline cardiomegaly. Mild nonspecific interstitial prominence, which may relate to senescent change or low-grade interstitial edema.     Dictated by (CST): Keshawn Hernandez MD on 8/28/2021 at 8:01 PM bleed and bradycardia  - check ECHO reviewed EF 65%, no WMA. Grade 1 DD. Quality:  · Diet: soft   · PT/OT: pending   · DVT Prophylaxis: SCD due to above   · CODE status: DNR selective   · Dispo: per clinical course. Discharge prob tomorrow.  D/w dtr

## 2021-08-30 NOTE — PROGRESS NOTES
08/30/21 0911   Wound 08/28/21 Skin Tear Arm Left   Date First Assessed/Time First Assessed: 08/28/21 7953   Present on Hospital Admission: Yes  Primary Wound Type: Skin Tear  Location: Arm  Wound Location Orientation: Left  Wound Description (Comments) xeroform, abd and kerlix roll. Bedside RN present during wound care. Allergies: Patient has no allergy information on record.     Labs:   Lab Results   Component Value Date    WBC 7.4 08/29/2021    HGB 11.8 (L) 08/29/2021    HCT 36.0 (L) 08/29/2021

## 2021-08-30 NOTE — OCCUPATIONAL THERAPY NOTE
OCCUPATIONAL THERAPY EVALUATION - INPATIENT     Room Number: 206/206-A  Evaluation Date: 8/30/2021  Type of Evaluation: Initial  Presenting Problem: fall , 1 Jatinder Nguyen    Physician Order: IP Consult to Occupational Therapy  Reason for Therapy: ADL/IADL Dysfunction ambulation with overall CGA with bout of Min A with quick turns, LE dressing with Mod A, grooming with SBA in sitting.      The patient's Approx Degree of Impairment: 66.57% has been calculated based on documentation in the [facility] '6 clicks' Inpatient Daily TOLERANCE  96% room air 68 bpm    COGNITION  Overall Cognitive Status:  Impaired      RANGE OF MOTION   Upper extremity ROM is within functional limits     STRENGTH ASSESSMENT  Upper extremity strength is within functional limits     ACTIVITIES OF DAILY LI

## 2021-08-30 NOTE — CONSULTS
[facility]    Report of Consultation    Eriberto Haywardle Patient Status:  Inpatient    1935 MRN [id number]   Location [facility] 2W/SW Attending Machelle Dasilva MD   Hosp Day # 2 PCP 1111 RYAN Prieto     Date of  needed. Magnesium Hydroxide (MILK OF MAGNESIA OR), Take 30 mL by mouth every 4 (four) hours as needed (constipation). multivitamin Oral Tab, Take 1 tablet by mouth daily. aspirin 81 MG Oral Chew Tab, Chew 81 mg by mouth daily.   clotrimazole 1 % External K 4.0 08/29/2021     08/29/2021    CO2 25.0 08/29/2021     (H) 08/29/2021    CA 8.3 (L) 08/29/2021    ALB 3.5 05/06/2021    ALKPHO 85 05/06/2021    TP 7.7 05/06/2021    AST 15 05/06/2021    ALT 26 05/06/2021    T4F 1.0 08/28/2021    TSH 4.3 decrease in the density of 5 mm intraparenchymal hematoma/contusion in the right frontal lobe as well as a slight decrease in the density of small subarachnoid hemorrhage in the left frontal lobe.   No additional evidence  of intra or extra-axial collection the sulci of the left frontal lobe. Encephalomalacia involving the right anterior frontal lobe. Scattered hypodense foci noted in the subcortical and periventricular white matter of both cerebral hemispheres. No significant mass effect or midline shift. Date: 8/28/2021  PROCEDURE: CT FACIAL BONES (CPT=70486)  COMPARISON: None. INDICATIONS: S/p fall, laceration to face, h/o dementia.   TECHNIQUE: Multidetector CT images centered about the maxillofacial structures were obtained without intravenous contrast. maxillofacial bones. There are postsurgical changes/a cerclage wire at the level of the left mandibular angle. 3. Multifocal paranasal sinus mucosal thickening, which is most pronounced within the ethmoid and maxillary sinuses.   There may be partial exten thyroid nodule. 5. Biapical emphysema.    Dictated by (CST): Nanci Vizcaino MD on 8/28/2021 at 7:08 PM     Finalized by (CST): Nanci Vizcaino MD on 8/28/2021 at 7:11 PM          CARD ECHO 2D DOPPLER (CPT=93306)    Result Date: 8/30/2021 Elective. Procedure:  Transthoracic echocardiography. The study was technically limited due to poor acoustic window availability and body habitus. Scanning was performed from the parasternal, apical, and subcostal acoustic windows. Study completion:   The pericardial effusion. Quality of study:  Image quality was poor. Systemic veins: Inferior vena cava: The vessel was normal in size.  The respirophasic diameter changes were in the normal range (= 50%), consistent with normal central venous pressure. ------- 3.5   cm     &lt;4.1   Left atrium                                 Value        Reference  LA ID, A-P, ES                              3.6   cm     3.0 - 4.0   Mitral valve                                Value        Reference  Mitral E-wave peak veloci BONES: Mild degenerative changes of the thoracic spine are apparent. Demineralization. OTHER:   Negative. CONCLUSION:  1. No focal airspace disease or significant pleural effusion. 2. Borderline cardiomegaly.   Mild nonspecific interstitial promin

## 2021-08-30 NOTE — CONSULTS
[facility]    PATIENT'S NAME: Reno Diana   ATTENDING PHYSICIAN: Memo Tong MD   CONSULTING PHYSICIAN: [facility]  Ollie Vigil MD   PATIENT ACCOUNT#:   [id number]    LOCATION:  [address] [place] [address] RECORD #:   [id number] and walks fast.  Unfortunately, this has happened before. He tends to trip and fall down. On this occasion, he tripped and fell down. VITAL SIGNS:  He was bradycardiac with the pulse dropping to about 44.    HEENT:  His nose was covered with gauze as he

## 2021-08-31 VITALS
WEIGHT: 167.5 LBS | OXYGEN SATURATION: 96 % | HEART RATE: 69 BPM | DIASTOLIC BLOOD PRESSURE: 87 MMHG | SYSTOLIC BLOOD PRESSURE: 158 MMHG | RESPIRATION RATE: 18 BRPM | HEIGHT: 69 IN | TEMPERATURE: 98 F | BODY MASS INDEX: 24.81 KG/M2

## 2021-08-31 LAB
ANION GAP SERPL CALC-SCNC: 4 MMOL/L (ref 0–18)
BUN BLD-MCNC: 9 MG/DL (ref 7–18)
BUN/CREAT SERPL: 8.7 (ref 10–20)
CALCIUM BLD-MCNC: 8.9 MG/DL (ref 8.5–10.1)
CHLORIDE SERPL-SCNC: 109 MMOL/L (ref 98–112)
CO2 SERPL-SCNC: 27 MMOL/L (ref 21–32)
CREAT BLD-MCNC: 1.04 MG/DL
DEPRECATED RDW RBC AUTO: 49.5 FL (ref 35.1–46.3)
ERYTHROCYTE [DISTWIDTH] IN BLOOD BY AUTOMATED COUNT: 14.9 % (ref 11–15)
GLUCOSE BLD-MCNC: 88 MG/DL (ref 70–99)
HCT VFR BLD AUTO: 35.3 %
HGB BLD-MCNC: 11.9 G/DL
MCH RBC QN AUTO: 30.6 PG (ref 26–34)
MCHC RBC AUTO-ENTMCNC: 33.7 G/DL (ref 31–37)
MCV RBC AUTO: 90.7 FL
OSMOLALITY SERPL CALC.SUM OF ELEC: 288 MOSM/KG (ref 275–295)
PLATELET # BLD AUTO: 181 10(3)UL (ref 150–450)
POTASSIUM SERPL-SCNC: 3.8 MMOL/L (ref 3.5–5.1)
RBC # BLD AUTO: 3.89 X10(6)UL
SODIUM SERPL-SCNC: 140 MMOL/L (ref 136–145)
WBC # BLD AUTO: 5.6 X10(3) UL (ref 4–11)

## 2021-08-31 PROCEDURE — 99239 HOSP IP/OBS DSCHRG MGMT >30: CPT | Performed by: HOSPITALIST

## 2021-08-31 NOTE — PLAN OF CARE
Pt alert to self, confused, at times combative. Refused to let me wash face to clean dry blood. No complaints of pain throughout the night. Frequent nurse rounding done, camera in place.    Pt combative in AM, refusing morning medication and would still not

## 2021-08-31 NOTE — CM/SW NOTE
08/31/21 1034   Discharge disposition   Expected discharge disposition [facility] Provider [address]   Discharge transportation [facility]     Per Dr. Shalom Hall - pt cleared for DC today.     ANGE confirmed w/ Josephine Huynh at [facility] - can acc

## 2021-08-31 NOTE — PROGRESS NOTES
[facility] Progress Note    Celi Malhotra Patient Status:  Inpatient    1935 MRN [id number]   Location [facility] 3W/SW Attending Deuce Koo MD   [address] Day # 3 PCP 1111 RYAN Prieto     Subjective:  No acute e off AC/asa/plavix x 4 weeks  • Sinus bradycardia- low HR 39 at night, beta blocker discontinued  • HTN-sbp 112-160, allow permissive htn unless parameters needed from nuero   • CAD- reported, aspirin on hold with SAH  • Dementia     Plan:    • No acute nee

## 2021-08-31 NOTE — DISCHARGE PLANNING
I called and gave report to nurse Daysi Johansen at [address] [place] [address] rehab facility. Patient's physical and history were relayed to nursing staff and included past medical history, admitting diagnosis of fall with subarachnoid hemorrahage.  Patient will be picked

## 2021-08-31 NOTE — CM/SW NOTE
Per RN rounds, pt may be cleared for DC later today. ANGE contacted Nicolás w/ [facility] via phone to discuss. Confirmed he received updates via [facility] yesterday 8/30. ANGE inquired if they can accept for SNF - per Kylah Gay, he will confirm and then f/up w/ ANGE.     UPDATE

## 2021-08-31 NOTE — DISCHARGE SUMMARY
[facility] Discharge Summary   Patient ID:  Goldie Jefferson  [id number]  60 year old  11/8/1935    Admit date: 8/28/2021  Discharge date: 8/31/2021  Primary Care Physician: Pasquale Prieto   Attending Physician: Claire Alonso MD   Consu due to bleed and bradycardia  - check ECHO reviewed EF 65%, no WMA.  Grade 1 DD.           EXAM:   GENERAL: no apparent distress, comfortable  NEURO: A/A Ox3, no focal deficits  RESP: non labored, CTAB/L  CARDIO: Regular, no murmur  ABD: soft, NT, ND  EXTRE remote trauma or infarction. 4. Nonspecific white matter changes involving both cerebral hemispheres that most likely reflect sequelae of chronic microangiopathy. 5. Intracranial atherosclerosis.   Results of this examination were discussed with the patient effusion. 2. Borderline cardiomegaly. Mild nonspecific interstitial prominence, which may relate to senescent change or low-grade interstitial edema.     Dictated by (CST): Uzma Roblero MD on 8/28/2021 at 8:01 PM     Finalized by (CST): Uzma Roblero,

## 2021-09-14 ENCOUNTER — HOSPITAL ENCOUNTER (OUTPATIENT)
Dept: CT IMAGING | Facility: HOSPITAL | Age: 86
Discharge: HOME | End: 2021-09-14
Attending: INTERNAL MEDICINE
Payer: MEDICARE

## 2021-09-14 DIAGNOSIS — S06.6X0D TRAUMATIC SUBARACHNOID HEMORRHAGE WITHOUT LOSS OF CONSCIOUSNESS, SUBSEQUENT ENCOUNTER: ICD-10-CM

## 2021-09-14 PROCEDURE — 70450 CT HEAD/BRAIN W/O DYE: CPT | Performed by: INTERNAL MEDICINE

## 2021-10-21 ENCOUNTER — LAB ENCOUNTER (OUTPATIENT)
Dept: LAB | Facility: HOSPITAL | Age: 86
End: 2021-10-21
Payer: MEDICARE

## 2021-10-21 DIAGNOSIS — R41.0 CONFUSION: Primary | ICD-10-CM

## 2021-10-21 PROCEDURE — 87086 URINE CULTURE/COLONY COUNT: CPT

## 2022-02-17 ENCOUNTER — APPOINTMENT (OUTPATIENT)
Dept: CT IMAGING | Facility: HOSPITAL | Age: 87
End: 2022-02-17
Attending: STUDENT IN AN ORGANIZED HEALTH CARE EDUCATION/TRAINING PROGRAM
Payer: MEDICARE

## 2022-02-17 ENCOUNTER — HOSPITAL ENCOUNTER (EMERGENCY)
Facility: HOSPITAL | Age: 87
Discharge: NURSING, REHAB OR LONG-TERM CARE | End: 2022-02-17
Attending: STUDENT IN AN ORGANIZED HEALTH CARE EDUCATION/TRAINING PROGRAM
Payer: MEDICARE

## 2022-02-17 VITALS
RESPIRATION RATE: 18 BRPM | TEMPERATURE: 98 F | OXYGEN SATURATION: 99 % | SYSTOLIC BLOOD PRESSURE: 151 MMHG | DIASTOLIC BLOOD PRESSURE: 86 MMHG | HEART RATE: 79 BPM

## 2022-02-17 DIAGNOSIS — N17.9 AKI (ACUTE KIDNEY INJURY) (HCC): Primary | ICD-10-CM

## 2022-02-17 LAB
ANION GAP SERPL CALC-SCNC: 8 MMOL/L (ref 0–18)
BASOPHILS # BLD AUTO: 0.05 X10(3) UL (ref 0–0.2)
BASOPHILS NFR BLD AUTO: 0.7 %
BUN BLD-MCNC: 35 MG/DL (ref 7–18)
BUN/CREAT SERPL: 20.2 (ref 10–20)
CALCIUM BLD-MCNC: 9.8 MG/DL (ref 8.5–10.1)
CHLORIDE SERPL-SCNC: 108 MMOL/L (ref 98–112)
CO2 SERPL-SCNC: 26 MMOL/L (ref 21–32)
DEPRECATED RDW RBC AUTO: 52.7 FL (ref 35.1–46.3)
EOSINOPHIL # BLD AUTO: 0.03 X10(3) UL (ref 0–0.7)
EOSINOPHIL NFR BLD AUTO: 0.4 %
ERYTHROCYTE [DISTWIDTH] IN BLOOD BY AUTOMATED COUNT: 15.7 % (ref 11–15)
GLUCOSE BLD-MCNC: 145 MG/DL (ref 70–99)
HCT VFR BLD AUTO: 47.9 %
HGB BLD-MCNC: 15.7 G/DL
IMM GRANULOCYTES # BLD AUTO: 0.02 X10(3) UL (ref 0–1)
IMM GRANULOCYTES NFR BLD: 0.3 %
LYMPHOCYTES # BLD AUTO: 1.53 X10(3) UL (ref 1–4)
LYMPHOCYTES NFR BLD AUTO: 22.4 %
MCH RBC QN AUTO: 30.1 PG (ref 26–34)
MCHC RBC AUTO-ENTMCNC: 32.8 G/DL (ref 31–37)
MCV RBC AUTO: 91.9 FL
MONOCYTES # BLD AUTO: 0.6 X10(3) UL (ref 0.1–1)
MONOCYTES NFR BLD AUTO: 8.8 %
NEUTROPHILS # BLD AUTO: 4.59 X10 (3) UL (ref 1.5–7.7)
NEUTROPHILS # BLD AUTO: 4.59 X10(3) UL (ref 1.5–7.7)
NEUTROPHILS NFR BLD AUTO: 67.4 %
OSMOLALITY SERPL CALC.SUM OF ELEC: 305 MOSM/KG (ref 275–295)
PLATELET # BLD AUTO: 188 10(3)UL (ref 150–450)
POTASSIUM SERPL-SCNC: 4 MMOL/L (ref 3.5–5.1)
RBC # BLD AUTO: 5.21 X10(6)UL
SODIUM SERPL-SCNC: 142 MMOL/L (ref 136–145)
WBC # BLD AUTO: 6.8 X10(3) UL (ref 4–11)

## 2022-02-17 PROCEDURE — 99285 EMERGENCY DEPT VISIT HI MDM: CPT

## 2022-02-17 PROCEDURE — 36415 COLL VENOUS BLD VENIPUNCTURE: CPT

## 2022-02-17 PROCEDURE — 70450 CT HEAD/BRAIN W/O DYE: CPT | Performed by: STUDENT IN AN ORGANIZED HEALTH CARE EDUCATION/TRAINING PROGRAM

## 2022-02-17 PROCEDURE — 85025 COMPLETE CBC W/AUTO DIFF WBC: CPT | Performed by: STUDENT IN AN ORGANIZED HEALTH CARE EDUCATION/TRAINING PROGRAM

## 2022-02-17 PROCEDURE — 80048 BASIC METABOLIC PNL TOTAL CA: CPT | Performed by: STUDENT IN AN ORGANIZED HEALTH CARE EDUCATION/TRAINING PROGRAM

## 2022-02-17 NOTE — ED INITIAL ASSESSMENT (HPI)
Pt to ED via [facility] for \"unsteadiness\" per the RN at care center. Pt denies pain at this time. Pt has hx dementia. Pt arrived in soiled clothes, hx of falls and unsteady gait. Uses wheelchair at home. Pt denies nausea/vomiting.

## 2022-02-17 NOTE — ED QUICK NOTES
Superior at bedside, report given to medics at bedside. Pt transported back to [facility] with all belongings.

## 2022-02-17 NOTE — ED QUICK NOTES
RN spoke with Carmelo Mckeon RN at [facility]. No new information stated, staes the CNA overnight called EMS but is unclear why. Carmelo Mckeon updated on pt's return today.

## 2022-02-17 NOTE — ED QUICK NOTES
Pt's dentures cleaned and eating breakfast and tolerating well. Pt's daughter spoken with, Jose Grigsby, she states she was able to get in touch with nursing facility and they stated the patient was unable to get off of the toilet by himself which is not his norm. This RN attempted to call Maximiliano again with no answer. MD notified.

## 2022-03-09 ENCOUNTER — HOSPITAL ENCOUNTER (INPATIENT)
Facility: HOSPITAL | Age: 87
LOS: 4 days | Discharge: NURSING, REHAB OR LONG-TERM CARE | DRG: 682 | End: 2022-03-13
Attending: EMERGENCY MEDICINE | Admitting: STUDENT IN AN ORGANIZED HEALTH CARE EDUCATION/TRAINING PROGRAM
Payer: MEDICARE

## 2022-03-09 ENCOUNTER — APPOINTMENT (OUTPATIENT)
Dept: CT IMAGING | Facility: HOSPITAL | Age: 87
DRG: 682 | End: 2022-03-09
Attending: STUDENT IN AN ORGANIZED HEALTH CARE EDUCATION/TRAINING PROGRAM
Payer: MEDICARE

## 2022-03-09 ENCOUNTER — APPOINTMENT (OUTPATIENT)
Dept: ULTRASOUND IMAGING | Facility: HOSPITAL | Age: 87
DRG: 682 | End: 2022-03-09
Attending: STUDENT IN AN ORGANIZED HEALTH CARE EDUCATION/TRAINING PROGRAM
Payer: MEDICARE

## 2022-03-09 DIAGNOSIS — R19.7 DIARRHEA, UNSPECIFIED TYPE: ICD-10-CM

## 2022-03-09 DIAGNOSIS — N17.9 AKI (ACUTE KIDNEY INJURY) (HCC): Primary | ICD-10-CM

## 2022-03-09 DIAGNOSIS — R77.8 ELEVATED TROPONIN: ICD-10-CM

## 2022-03-09 DIAGNOSIS — I48.91 ATRIAL FIBRILLATION, NEW ONSET (HCC): ICD-10-CM

## 2022-03-09 LAB
ANION GAP SERPL CALC-SCNC: 15 MMOL/L (ref 0–18)
BASOPHILS # BLD AUTO: 0.04 X10(3) UL (ref 0–0.2)
BASOPHILS NFR BLD AUTO: 0.4 %
BILIRUB UR QL: NEGATIVE
BUN BLD-MCNC: 33 MG/DL (ref 7–18)
BUN/CREAT SERPL: 12.5 (ref 10–20)
CALCIUM BLD-MCNC: 10.3 MG/DL (ref 8.5–10.1)
CHLORIDE SERPL-SCNC: 106 MMOL/L (ref 98–112)
CHOLEST SERPL-MCNC: 200 MG/DL (ref ?–200)
CO2 SERPL-SCNC: 21 MMOL/L (ref 21–32)
COLOR UR: YELLOW
CREAT BLD-MCNC: 2.65 MG/DL
D DIMER PPP FEU-MCNC: >20 UG/ML FEU (ref ?–0.84)
DEPRECATED RDW RBC AUTO: 52.7 FL (ref 35.1–46.3)
EOSINOPHIL # BLD AUTO: 0 X10(3) UL (ref 0–0.7)
EOSINOPHIL NFR BLD AUTO: 0 %
ERYTHROCYTE [DISTWIDTH] IN BLOOD BY AUTOMATED COUNT: 15.9 % (ref 11–15)
GLUCOSE BLD-MCNC: 150 MG/DL (ref 70–99)
GLUCOSE BLDC GLUCOMTR-MCNC: 147 MG/DL (ref 70–99)
GLUCOSE UR-MCNC: NEGATIVE MG/DL
HCT VFR BLD AUTO: 55.9 %
HDLC SERPL-MCNC: 69 MG/DL (ref 40–59)
HGB BLD-MCNC: 18.2 G/DL
HGB UR QL STRIP.AUTO: NEGATIVE
HYALINE CASTS #/AREA URNS AUTO: PRESENT /LPF
IMM GRANULOCYTES # BLD AUTO: 0.06 X10(3) UL (ref 0–1)
IMM GRANULOCYTES NFR BLD: 0.6 %
LDLC SERPL CALC-MCNC: 113 MG/DL (ref ?–100)
LYMPHOCYTES # BLD AUTO: 0.44 X10(3) UL (ref 1–4)
LYMPHOCYTES NFR BLD AUTO: 4.3 %
MAGNESIUM SERPL-MCNC: 2.3 MG/DL (ref 1.6–2.6)
MCH RBC QN AUTO: 30 PG (ref 26–34)
MCHC RBC AUTO-ENTMCNC: 32.6 G/DL (ref 31–37)
MCV RBC AUTO: 92.1 FL
MONOCYTES NFR BLD AUTO: 7.7 %
MRSA DNA SPEC QL NAA+PROBE: NEGATIVE
NEUTROPHILS # BLD AUTO: 9.01 X10 (3) UL (ref 1.5–7.7)
NEUTROPHILS # BLD AUTO: 9.01 X10(3) UL (ref 1.5–7.7)
NEUTROPHILS NFR BLD AUTO: 87 %
NITRITE UR QL STRIP.AUTO: NEGATIVE
NONHDLC SERPL-MCNC: 131 MG/DL (ref ?–130)
OSMOLALITY SERPL CALC.SUM OF ELEC: 304 MOSM/KG (ref 275–295)
PH UR: 6 [PH] (ref 5–8)
PLATELET # BLD AUTO: 184 10(3)UL (ref 150–450)
POTASSIUM SERPL-SCNC: 4.6 MMOL/L (ref 3.5–5.1)
PROT UR-MCNC: 30 MG/DL
RBC # BLD AUTO: 6.07 X10(6)UL
SARS-COV-2 RNA RESP QL NAA+PROBE: NOT DETECTED
SODIUM SERPL-SCNC: 142 MMOL/L (ref 136–145)
SP GR UR STRIP: 1.02 (ref 1–1.03)
T4 FREE SERPL-MCNC: 1.1 NG/DL (ref 0.8–1.7)
TROPONIN I HIGH SENSITIVITY: 137 NG/L
TROPONIN I HIGH SENSITIVITY: 203 NG/L
TSI SER-ACNC: 6.92 MIU/ML (ref 0.36–3.74)
UROBILINOGEN UR STRIP-ACNC: 2
VIT C UR-MCNC: 40 MG/DL
VLDLC SERPL CALC-MCNC: 18 MG/DL (ref 0–30)
WBC # BLD AUTO: 10.4 X10(3) UL (ref 4–11)

## 2022-03-09 PROCEDURE — 81001 URINALYSIS AUTO W/SCOPE: CPT | Performed by: EMERGENCY MEDICINE

## 2022-03-09 PROCEDURE — 84484 ASSAY OF TROPONIN QUANT: CPT | Performed by: EMERGENCY MEDICINE

## 2022-03-09 PROCEDURE — 82962 GLUCOSE BLOOD TEST: CPT

## 2022-03-09 PROCEDURE — 83735 ASSAY OF MAGNESIUM: CPT | Performed by: STUDENT IN AN ORGANIZED HEALTH CARE EDUCATION/TRAINING PROGRAM

## 2022-03-09 PROCEDURE — 80061 LIPID PANEL: CPT | Performed by: EMERGENCY MEDICINE

## 2022-03-09 PROCEDURE — 87086 URINE CULTURE/COLONY COUNT: CPT | Performed by: EMERGENCY MEDICINE

## 2022-03-09 PROCEDURE — 85379 FIBRIN DEGRADATION QUANT: CPT | Performed by: EMERGENCY MEDICINE

## 2022-03-09 PROCEDURE — 93010 ELECTROCARDIOGRAM REPORT: CPT | Performed by: EMERGENCY MEDICINE

## 2022-03-09 PROCEDURE — 99291 CRITICAL CARE FIRST HOUR: CPT

## 2022-03-09 PROCEDURE — 84484 ASSAY OF TROPONIN QUANT: CPT | Performed by: STUDENT IN AN ORGANIZED HEALTH CARE EDUCATION/TRAINING PROGRAM

## 2022-03-09 PROCEDURE — 96374 THER/PROPH/DIAG INJ IV PUSH: CPT

## 2022-03-09 PROCEDURE — 84443 ASSAY THYROID STIM HORMONE: CPT | Performed by: STUDENT IN AN ORGANIZED HEALTH CARE EDUCATION/TRAINING PROGRAM

## 2022-03-09 PROCEDURE — 85060 BLOOD SMEAR INTERPRETATION: CPT | Performed by: EMERGENCY MEDICINE

## 2022-03-09 PROCEDURE — 87641 MR-STAPH DNA AMP PROBE: CPT | Performed by: EMERGENCY MEDICINE

## 2022-03-09 PROCEDURE — 96361 HYDRATE IV INFUSION ADD-ON: CPT

## 2022-03-09 PROCEDURE — 70450 CT HEAD/BRAIN W/O DYE: CPT | Performed by: STUDENT IN AN ORGANIZED HEALTH CARE EDUCATION/TRAINING PROGRAM

## 2022-03-09 PROCEDURE — 93005 ELECTROCARDIOGRAM TRACING: CPT

## 2022-03-09 PROCEDURE — 85025 COMPLETE CBC W/AUTO DIFF WBC: CPT | Performed by: EMERGENCY MEDICINE

## 2022-03-09 PROCEDURE — 80048 BASIC METABOLIC PNL TOTAL CA: CPT | Performed by: EMERGENCY MEDICINE

## 2022-03-09 PROCEDURE — 76770 US EXAM ABDO BACK WALL COMP: CPT | Performed by: STUDENT IN AN ORGANIZED HEALTH CARE EDUCATION/TRAINING PROGRAM

## 2022-03-09 PROCEDURE — 84439 ASSAY OF FREE THYROXINE: CPT | Performed by: STUDENT IN AN ORGANIZED HEALTH CARE EDUCATION/TRAINING PROGRAM

## 2022-03-09 RX ORDER — MELATONIN
3 NIGHTLY PRN
Status: DISCONTINUED | OUTPATIENT
Start: 2022-03-09 | End: 2022-03-13

## 2022-03-09 RX ORDER — DILTIAZEM HYDROCHLORIDE 5 MG/ML
10 INJECTION INTRAVENOUS ONCE
Status: COMPLETED | OUTPATIENT
Start: 2022-03-09 | End: 2022-03-09

## 2022-03-09 RX ORDER — SODIUM CHLORIDE, SODIUM LACTATE, POTASSIUM CHLORIDE, CALCIUM CHLORIDE 600; 310; 30; 20 MG/100ML; MG/100ML; MG/100ML; MG/100ML
INJECTION, SOLUTION INTRAVENOUS CONTINUOUS
Status: DISCONTINUED | OUTPATIENT
Start: 2022-03-09 | End: 2022-03-13

## 2022-03-09 RX ORDER — LEVOTHYROXINE SODIUM 0.07 MG/1
75 TABLET ORAL
Status: DISCONTINUED | OUTPATIENT
Start: 2022-03-09 | End: 2022-03-13

## 2022-03-09 RX ORDER — ONDANSETRON 2 MG/ML
4 INJECTION INTRAMUSCULAR; INTRAVENOUS EVERY 6 HOURS PRN
Status: DISCONTINUED | OUTPATIENT
Start: 2022-03-09 | End: 2022-03-13

## 2022-03-09 RX ORDER — DONEPEZIL HYDROCHLORIDE 10 MG/1
10 TABLET, FILM COATED ORAL NIGHTLY
Status: DISCONTINUED | OUTPATIENT
Start: 2022-03-09 | End: 2022-03-13

## 2022-03-09 RX ORDER — QUETIAPINE FUMARATE 25 MG/1
25 TABLET, FILM COATED ORAL NIGHTLY
Status: DISCONTINUED | OUTPATIENT
Start: 2022-03-09 | End: 2022-03-09

## 2022-03-09 RX ORDER — MIRTAZAPINE 7.5 MG/1
7.5 TABLET, FILM COATED ORAL NIGHTLY
Status: DISCONTINUED | OUTPATIENT
Start: 2022-03-09 | End: 2022-03-13

## 2022-03-09 RX ORDER — HEPARIN SODIUM 5000 [USP'U]/ML
5000 INJECTION, SOLUTION INTRAVENOUS; SUBCUTANEOUS EVERY 8 HOURS SCHEDULED
Status: CANCELLED | OUTPATIENT
Start: 2022-03-09

## 2022-03-09 RX ORDER — ACETAMINOPHEN 325 MG/1
650 TABLET ORAL EVERY 6 HOURS PRN
Status: DISCONTINUED | OUTPATIENT
Start: 2022-03-09 | End: 2022-03-13

## 2022-03-09 RX ORDER — DIVALPROEX SODIUM 250 MG/1
250 TABLET, DELAYED RELEASE ORAL 2 TIMES DAILY
COMMUNITY

## 2022-03-09 RX ORDER — CLOTRIMAZOLE 1 %
1 CREAM (GRAM) TOPICAL 2 TIMES DAILY
Status: DISCONTINUED | OUTPATIENT
Start: 2022-03-09 | End: 2022-03-13

## 2022-03-09 RX ORDER — DIVALPROEX SODIUM 250 MG/1
250 TABLET, DELAYED RELEASE ORAL 2 TIMES DAILY
Status: DISCONTINUED | OUTPATIENT
Start: 2022-03-09 | End: 2022-03-13

## 2022-03-09 NOTE — ED QUICK NOTES
Orders for admission, patient is aware of plan and ready to go upstairs. LOC:AOx1, answers some questions appropriately, other times will not answer. Unknown if ambulatory at nursing home, unknown if hx of recent falls - high fall risk.     MEDS:n/a    FLUIDS: 1L 0.9 bolus - infused    ACCESS: 20g PIV    Rapid COVID is negative    Any questions, please call ED DARY Faulkner  at extension [phone]

## 2022-03-09 NOTE — ED INITIAL ASSESSMENT (HPI)
Brought by [facility] from [facility] for diarrhea and weakness for an unspecified amount of time. EMS noted patient to be both bradycardic and tachycardic PTA. AOx1 on arrival, slow to respond. Afib/flutter on EKG.

## 2022-03-09 NOTE — ED QUICK NOTES
Patient transported to room 315 with transporter. Asleep and arousable at time of ED departure. All patient belongings included in transport. Daughter Oanh Cheng updated with plan of care.

## 2022-03-10 ENCOUNTER — APPOINTMENT (OUTPATIENT)
Dept: PICC SERVICES | Facility: HOSPITAL | Age: 87
DRG: 682 | End: 2022-03-10
Attending: STUDENT IN AN ORGANIZED HEALTH CARE EDUCATION/TRAINING PROGRAM
Payer: MEDICARE

## 2022-03-10 ENCOUNTER — APPOINTMENT (OUTPATIENT)
Dept: NUCLEAR MEDICINE | Facility: HOSPITAL | Age: 87
DRG: 682 | End: 2022-03-10
Attending: STUDENT IN AN ORGANIZED HEALTH CARE EDUCATION/TRAINING PROGRAM
Payer: MEDICARE

## 2022-03-10 LAB
ANION GAP SERPL CALC-SCNC: 4 MMOL/L (ref 0–18)
BUN BLD-MCNC: 36 MG/DL (ref 7–18)
BUN/CREAT SERPL: 19.9 (ref 10–20)
CALCIUM BLD-MCNC: 9.1 MG/DL (ref 8.5–10.1)
CHLORIDE SERPL-SCNC: 114 MMOL/L (ref 98–112)
CHLORIDE UR-SCNC: 30 MMOL/L
CO2 SERPL-SCNC: 27 MMOL/L (ref 21–32)
CREAT BLD-MCNC: 1.81 MG/DL
CREAT UR-SCNC: 281 MG/DL
DEPRECATED RDW RBC AUTO: 52 FL (ref 35.1–46.3)
ERYTHROCYTE [DISTWIDTH] IN BLOOD BY AUTOMATED COUNT: 15.9 % (ref 11–15)
GLUCOSE BLD-MCNC: 97 MG/DL (ref 70–99)
HCT VFR BLD AUTO: 45.1 %
HGB BLD-MCNC: 15 G/DL
MCH RBC QN AUTO: 29.9 PG (ref 26–34)
MCHC RBC AUTO-ENTMCNC: 33.3 G/DL (ref 31–37)
MCV RBC AUTO: 90 FL
OSMOLALITY SERPL CALC.SUM OF ELEC: 308 MOSM/KG (ref 275–295)
PLATELET # BLD AUTO: 146 10(3)UL (ref 150–450)
POTASSIUM SERPL-SCNC: 4.8 MMOL/L (ref 3.5–5.1)
POTASSIUM UR-SCNC: 73.7 MMOL/L
PROT UR-MCNC: 78.2 MG/DL
PROT/CREAT UR-RTO: 0.28
RBC # BLD AUTO: 5.01 X10(6)UL
SODIUM SERPL-SCNC: 13 MMOL/L
SODIUM SERPL-SCNC: 145 MMOL/L (ref 136–145)
TROPONIN I HIGH SENSITIVITY: 220 NG/L
TROPONIN I HIGH SENSITIVITY: 231 NG/L
WBC # BLD AUTO: 9.8 X10(3) UL (ref 4–11)

## 2022-03-10 PROCEDURE — 84156 ASSAY OF PROTEIN URINE: CPT | Performed by: STUDENT IN AN ORGANIZED HEALTH CARE EDUCATION/TRAINING PROGRAM

## 2022-03-10 PROCEDURE — 78582 LUNG VENTILAT&PERFUS IMAGING: CPT | Performed by: STUDENT IN AN ORGANIZED HEALTH CARE EDUCATION/TRAINING PROGRAM

## 2022-03-10 PROCEDURE — 85027 COMPLETE CBC AUTOMATED: CPT | Performed by: STUDENT IN AN ORGANIZED HEALTH CARE EDUCATION/TRAINING PROGRAM

## 2022-03-10 PROCEDURE — 80048 BASIC METABOLIC PNL TOTAL CA: CPT | Performed by: STUDENT IN AN ORGANIZED HEALTH CARE EDUCATION/TRAINING PROGRAM

## 2022-03-10 PROCEDURE — 82436 ASSAY OF URINE CHLORIDE: CPT | Performed by: STUDENT IN AN ORGANIZED HEALTH CARE EDUCATION/TRAINING PROGRAM

## 2022-03-10 PROCEDURE — 84300 ASSAY OF URINE SODIUM: CPT | Performed by: STUDENT IN AN ORGANIZED HEALTH CARE EDUCATION/TRAINING PROGRAM

## 2022-03-10 PROCEDURE — 84484 ASSAY OF TROPONIN QUANT: CPT | Performed by: STUDENT IN AN ORGANIZED HEALTH CARE EDUCATION/TRAINING PROGRAM

## 2022-03-10 PROCEDURE — 82570 ASSAY OF URINE CREATININE: CPT | Performed by: STUDENT IN AN ORGANIZED HEALTH CARE EDUCATION/TRAINING PROGRAM

## 2022-03-10 PROCEDURE — 84133 ASSAY OF URINE POTASSIUM: CPT | Performed by: STUDENT IN AN ORGANIZED HEALTH CARE EDUCATION/TRAINING PROGRAM

## 2022-03-10 NOTE — PROGRESS NOTES
Lab tech made this writer aware that patient would not allow labs to be drawn. Lab tech stated that patient cursed and yelled for her not to touch him and get out of his room.

## 2022-03-10 NOTE — OCCUPATIONAL THERAPY NOTE
Occupational Therapy order received, chart reviewed. Patient admitted with MADELYN, elevated d-dimer and troponin, and A fib with RVR. NM Lung VQ/perfusion scan revealed \"intermediate probability for pulmonary embolism. \" OT will hold for today and re-attempt tomorrow as medically appropriate.       Kimberley Olvera, [address] [place] [address]  #[id number]

## 2022-03-10 NOTE — PLAN OF CARE
Problem: Patient Centered Care  Goal: Patient preferences are identified and integrated in the patient's plan of care  Description: Interventions:  - What would you like us to know as we care for you? Patient is deaf  - Provide timely, complete, and accurate information to patient/family  - Incorporate patient and family knowledge, values, beliefs, and cultural backgrounds into the planning and delivery of care  - Encourage patient/family to participate in care and decision-making at the level they choose  - Honor patient and family perspectives and choices  Outcome: Progressing     Problem: CARDIOVASCULAR - ADULT  Goal: Maintains optimal cardiac output and hemodynamic stability  Description: INTERVENTIONS:  - Monitor vital signs, rhythm, and trends  - Monitor for bleeding, hypotension and signs of decreased cardiac output  - Evaluate effectiveness of vasoactive medications to optimize hemodynamic stability  - Monitor arterial and/or venous puncture sites for bleeding and/or hematoma  - Assess quality of pulses, skin color and temperature  - Assess for signs of decreased coronary artery perfusion - ex.  Angina  - Evaluate fluid balance, assess for edema, trend weights  Outcome: Progressing  Goal: Absence of cardiac arrhythmias or at baseline  Description: INTERVENTIONS:  - Continuous cardiac monitoring, monitor vital signs, obtain 12 lead EKG if indicated  - Evaluate effectiveness of antiarrhythmic and heart rate control medications as ordered  - Initiate emergency measures for life threatening arrhythmias  - Monitor electrolytes and administer replacement therapy as ordered  Outcome: Progressing     Problem: NEUROLOGICAL - ADULT  Goal: Achieves stable or improved neurological status  Description: INTERVENTIONS  - Assess for and report changes in neurological status  - Initiate measures to prevent increased intracranial pressure  - Maintain blood pressure and fluid volume within ordered parameters to optimize cerebral perfusion and minimize risk of hemorrhage  - Monitor temperature, glucose, and sodium.  Initiate appropriate interventions as ordered  Outcome: Progressing  Goal: Absence of seizures  Description: INTERVENTIONS  - Monitor for seizure activity  - Administer anti-seizure medications as ordered  - Monitor neurological status  Outcome: Progressing  Goal: Remains free of injury related to seizure activity  Description: INTERVENTIONS:  - Maintain airway, patient safety  and administer oxygen as ordered  - Monitor patient for seizure activity, document and report duration and description of seizure to MD/LIP  - If seizure occurs, turn patient to side and suction secretions as needed  - Reorient patient post seizure  - Seizure pads on all 4 side rails  - Instruct patient/family to notify RN of any seizure activity  - Instruct patient/family to call for assistance with activity based on assessment  Outcome: Progressing  Goal: Achieves maximal functionality and self care  Description: INTERVENTIONS  - Monitor swallowing and airway patency with patient fatigue and changes in neurological status  - Encourage and assist patient to increase activity and self care with guidance from PT/OT  - Encourage visually impaired, hearing impaired and aphasic patients to use assistive/communication devices  Outcome: Progressing
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
## 2022-03-10 NOTE — PROGRESS NOTES
This writer spoke with Dr. Vickie Styles, regarding the patient behavior. Dr. Vickie Styles, stated that it was ok for now. Lab can try again later.

## 2022-03-10 NOTE — PLAN OF CARE
Problem: ALTERED NUTRIENT INTAKE - ADULT  Goal: Nutrient intake appropriate for improving, restoring or maintaining nutritional needs  Description: INTERVENTIONS:  - Assess nutritional status and recommend course of action  - Monitor oral intake, labs, and treatment plans  - Recommend appropriate diets, oral nutritional supplements, and vitamin/mineral supplements  - Provide specific nutrition education as appropriate  Outcome: Progressing   Mighty shake at lunch & dinner to maximized oral diet intake

## 2022-03-10 NOTE — PROGRESS NOTES
Called and left message with M.D @ [phone] regarding patient Troponin elevated from 137 to 203. Dr. Zhane Ocampo, returned call at . No n/o given, just monitor and if 0000 troponin increase to call.

## 2022-03-10 NOTE — CM/SW NOTE
03/10/22 0900   CM/SW Referral Data   Referral Source Social Work (self-referral)   Reason for Referral Discharge planning   Informant Daughter   Pertinent Medical Hx   Does patient have an established PCP? Yes  (Susan Hall)   Patient Info   Advanced directives? Yes   Patient's [address]   Post Acute Care Provider Upon Admission [facility]   Number of Levels in Home 1   Number of Stair in Home 0   Patient lives with Alone   Patient Status Prior to Admission   Independent with ADLs and Mobility No   Pt. requires assistance with Meals; Bathing;Medications   Discharge Needs   Anticipated D/C needs To be determined   Services Requested   DON Screen Requested Yes     SW self referred pt for DC Planning and assessment. ANGE contacted pt's dtr Sagrario Yip via phone and above assessment completed. Sagrario Brooksing confirmed pt lives at [facility] in [address] [facility] and has been there since November 2018. Pt does not use any assistive devices for ambulation but probably should. Pt receives assistance w/ showering, meals, and medication. ANGE sent updates to [facility] via Aidin and discussed w/ liaison Anum. PLAN: [facility], pending clinical course      SW/DOROTHY to remain available for support and/or discharge planning.          Caesar Fraire, MSW, [address] [facility]

## 2022-03-10 NOTE — PHYSICAL THERAPY NOTE
Orders received for PT eval. Pt admitted yesterday with elevated D-dimer, elevated troponin, new onset Afib. Pt's NM Lung VQ/perfusion scan from this morning demonstrated the following: \"Intermediate probability for pulmonary embolism. There is no radiographic comparison available. If clinically feasible, follow-up PE protocol chest CT is recommended. \"     Will re-attempt tomorrow if appropriate to see pt.

## 2022-03-11 ENCOUNTER — APPOINTMENT (OUTPATIENT)
Dept: ULTRASOUND IMAGING | Facility: HOSPITAL | Age: 87
DRG: 682 | End: 2022-03-11
Attending: STUDENT IN AN ORGANIZED HEALTH CARE EDUCATION/TRAINING PROGRAM
Payer: MEDICARE

## 2022-03-11 LAB
ANION GAP SERPL CALC-SCNC: 5 MMOL/L (ref 0–18)
BUN BLD-MCNC: 30 MG/DL (ref 7–18)
BUN/CREAT SERPL: 22.2 (ref 10–20)
C DIFF TOX B STL QL: NEGATIVE
CALCIUM BLD-MCNC: 8.9 MG/DL (ref 8.5–10.1)
CHLORIDE SERPL-SCNC: 111 MMOL/L (ref 98–112)
CO2 SERPL-SCNC: 26 MMOL/L (ref 21–32)
CREAT BLD-MCNC: 1.35 MG/DL
GLUCOSE BLD-MCNC: 121 MG/DL (ref 70–99)
OSMOLALITY SERPL CALC.SUM OF ELEC: 301 MOSM/KG (ref 275–295)
POTASSIUM SERPL-SCNC: 3.9 MMOL/L (ref 3.5–5.1)
SODIUM SERPL-SCNC: 142 MMOL/L (ref 136–145)

## 2022-03-11 PROCEDURE — 80048 BASIC METABOLIC PNL TOTAL CA: CPT | Performed by: STUDENT IN AN ORGANIZED HEALTH CARE EDUCATION/TRAINING PROGRAM

## 2022-03-11 PROCEDURE — 87493 C DIFF AMPLIFIED PROBE: CPT | Performed by: STUDENT IN AN ORGANIZED HEALTH CARE EDUCATION/TRAINING PROGRAM

## 2022-03-11 PROCEDURE — 93970 EXTREMITY STUDY: CPT | Performed by: STUDENT IN AN ORGANIZED HEALTH CARE EDUCATION/TRAINING PROGRAM

## 2022-03-11 NOTE — OCCUPATIONAL THERAPY NOTE
Occupational Therapy order received, chart reviewed. Patient admitted with MADELYN, elevated d-dimer and troponin, and A fib with RVR. NM Lung VQ/perfusion scan revealed \"intermediate probability for pulmonary embolism. \"    Per Dr. Shannan Bose is for patient to have B LE doppler. OT will hold for today and re-attempt tomorrow as medically appropriate.       Brandon Robertson, [address] [place] [address].  #[id number]

## 2022-03-11 NOTE — PLAN OF CARE
Problem: Patient Centered Care  Goal: Patient preferences are identified and integrated in the patient's plan of care  Description: Interventions:  - What would you like us to know as we care for you? From [facility]  - Provide timely, complete, and accurate information to patient/family  - Incorporate patient and family knowledge, values, beliefs, and cultural backgrounds into the planning and delivery of care  - Encourage patient/family to participate in care and decision-making at the level they choose  - Honor patient and family perspectives and choices  3/11/2022 1319 by Chandrakant Cortes RN  Outcome: Progressing  3/11/2022 1318 by Chandrakant Cortes RN  Outcome: Progressing     Problem: CARDIOVASCULAR - ADULT  Goal: Maintains optimal cardiac output and hemodynamic stability  Description: INTERVENTIONS:  - Monitor vital signs, rhythm, and trends  - Monitor for bleeding, hypotension and signs of decreased cardiac output  - Evaluate effectiveness of vasoactive medications to optimize hemodynamic stability  - Monitor arterial and/or venous puncture sites for bleeding and/or hematoma  - Assess quality of pulses, skin color and temperature  - Assess for signs of decreased coronary artery perfusion - ex.  Angina  - Evaluate fluid balance, assess for edema, trend weights  3/11/2022 1319 by Chandrakant Cortes RN  Outcome: Progressing  3/11/2022 1318 by Chandrakant Cortes RN  Outcome: Progressing  Goal: Absence of cardiac arrhythmias or at baseline  Description: INTERVENTIONS:  - Continuous cardiac monitoring, monitor vital signs, obtain 12 lead EKG if indicated  - Evaluate effectiveness of antiarrhythmic and heart rate control medications as ordered  - Initiate emergency measures for life threatening arrhythmias  - Monitor electrolytes and administer replacement therapy as ordered  3/11/2022 1319 by Chandrakant Cortes RN  Outcome: Progressing  3/11/2022 1318 by Chandrakant Cortes RN  Outcome: Progressing     Problem: NEUROLOGICAL - ADULT  Goal: Achieves stable or improved neurological status  Description: INTERVENTIONS  - Assess for and report changes in neurological status  - Initiate measures to prevent increased intracranial pressure  - Maintain blood pressure and fluid volume within ordered parameters to optimize cerebral perfusion and minimize risk of hemorrhage  - Monitor temperature, glucose, and sodium.  Initiate appropriate interventions as ordered  3/11/2022 1319 by Lew Delacruz RN  Outcome: Progressing  3/11/2022 1318 by Lew Delacruz RN  Outcome: Progressing  Goal: Absence of seizures  Description: INTERVENTIONS  - Monitor for seizure activity  - Administer anti-seizure medications as ordered  - Monitor neurological status  3/11/2022 1319 by Lew Delacruz RN  Outcome: Progressing  3/11/2022 1318 by Lew Delacruz RN  Outcome: Progressing  Goal: Remains free of injury related to seizure activity  Description: INTERVENTIONS:  - Maintain airway, patient safety  and administer oxygen as ordered  - Monitor patient for seizure activity, document and report duration and description of seizure to MD/LIP  - If seizure occurs, turn patient to side and suction secretions as needed  - Reorient patient post seizure  - Seizure pads on all 4 side rails  - Instruct patient/family to notify RN of any seizure activity  - Instruct patient/family to call for assistance with activity based on assessment  3/11/2022 1319 by Lew Delacruz RN  Outcome: Progressing  3/11/2022 1318 by Lew Delacruz RN  Outcome: Progressing  Goal: Achieves maximal functionality and self care  Description: INTERVENTIONS  - Monitor swallowing and airway patency with patient fatigue and changes in neurological status  - Encourage and assist patient to increase activity and self care with guidance from PT/OT  - Encourage visually impaired, hearing impaired and aphasic patients to use assistive/communication devices  3/11/2022 1319 by Ray Zepeda RN  Outcome: Progressing  3/11/2022 1318 by Ray Zepeda RN  Outcome: Progressing

## 2022-03-11 NOTE — CM/SW NOTE
BPCI-Advanced Medicare Program Note:  Plan of care reviewed for care coordination and discharge planning. Noted patient falls under  BPCI/Medicare program, with  for renal failure. JOSE tool was used to help determine next care setting. Thus, [address] recommendations is for home (memory care) pending patient progress. Case Management team is following for care coordination and discharge planning needs.

## 2022-03-11 NOTE — PHYSICAL THERAPY NOTE
PT orders received and chart reviewed. Patient admitted with MADEYLN, elevated d-dimer and troponin, and A fib with RVR. NM Lung VQ/perfusion scan revealed \"intermediate probability for pulmonary embolism. \"  Maria De Jesus Lopez  Per Dr. Avi Tavera is for patient to have B LE doppler. PT will hold for today and re-attempt tomorrow as medically appropriate.     Thank you,  Natalia Ceballos  Student Physical Therapist  [facility]  Ext: [phone]

## 2022-03-11 NOTE — PLAN OF CARE
Patient remains alert to self with confusion. Remains on RA. Accepted all HS meds without difficulty. All care provided by staff. No s/s of distress noted at this time. All safety measures in place. Will continue to monitor.

## 2022-03-11 NOTE — PLAN OF CARE
Problem: Patient Centered Care  Goal: Patient preferences are identified and integrated in the patient's plan of care  Description: Interventions:  - What would you like us to know as we care for you? From [facility]  - Provide timely, complete, and accurate information to patient/family  - Incorporate patient and family knowledge, values, beliefs, and cultural backgrounds into the planning and delivery of care  - Encourage patient/family to participate in care and decision-making at the level they choose  - Honor patient and family perspectives and choices  Outcome: Progressing     Problem: CARDIOVASCULAR - ADULT  Goal: Maintains optimal cardiac output and hemodynamic stability  Description: INTERVENTIONS:  - Monitor vital signs, rhythm, and trends  - Monitor for bleeding, hypotension and signs of decreased cardiac output  - Evaluate effectiveness of vasoactive medications to optimize hemodynamic stability  - Monitor arterial and/or venous puncture sites for bleeding and/or hematoma  - Assess quality of pulses, skin color and temperature  - Assess for signs of decreased coronary artery perfusion - ex.  Angina  - Evaluate fluid balance, assess for edema, trend weights  Outcome: Progressing  Goal: Absence of cardiac arrhythmias or at baseline  Description: INTERVENTIONS:  - Continuous cardiac monitoring, monitor vital signs, obtain 12 lead EKG if indicated  - Evaluate effectiveness of antiarrhythmic and heart rate control medications as ordered  - Initiate emergency measures for life threatening arrhythmias  - Monitor electrolytes and administer replacement therapy as ordered  Outcome: Progressing     Problem: NEUROLOGICAL - ADULT  Goal: Achieves stable or improved neurological status  Description: INTERVENTIONS  - Assess for and report changes in neurological status  - Initiate measures to prevent increased intracranial pressure  - Maintain blood pressure and fluid volume within ordered parameters to optimize cerebral perfusion and minimize risk of hemorrhage  - Monitor temperature, glucose, and sodium.  Initiate appropriate interventions as ordered  Outcome: Progressing  Goal: Absence of seizures  Description: INTERVENTIONS  - Monitor for seizure activity  - Administer anti-seizure medications as ordered  - Monitor neurological status  Outcome: Progressing  Goal: Remains free of injury related to seizure activity  Description: INTERVENTIONS:  - Maintain airway, patient safety  and administer oxygen as ordered  - Monitor patient for seizure activity, document and report duration and description of seizure to MD/LIP  - If seizure occurs, turn patient to side and suction secretions as needed  - Reorient patient post seizure  - Seizure pads on all 4 side rails  - Instruct patient/family to notify RN of any seizure activity  - Instruct patient/family to call for assistance with activity based on assessment  Outcome: Progressing  Goal: Achieves maximal functionality and self care  Description: INTERVENTIONS  - Monitor swallowing and airway patency with patient fatigue and changes in neurological status  - Encourage and assist patient to increase activity and self care with guidance from PT/OT  - Encourage visually impaired, hearing impaired and aphasic patients to use assistive/communication devices  Outcome: Progressing

## 2022-03-12 ENCOUNTER — APPOINTMENT (OUTPATIENT)
Dept: CT IMAGING | Facility: HOSPITAL | Age: 87
DRG: 682 | End: 2022-03-12
Attending: STUDENT IN AN ORGANIZED HEALTH CARE EDUCATION/TRAINING PROGRAM
Payer: MEDICARE

## 2022-03-12 LAB
ANION GAP SERPL CALC-SCNC: 3 MMOL/L (ref 0–18)
BASOPHILS # BLD AUTO: 0.02 X10(3) UL (ref 0–0.2)
BASOPHILS NFR BLD AUTO: 0.2 %
BUN BLD-MCNC: 22 MG/DL (ref 7–18)
BUN/CREAT SERPL: 22.4 (ref 10–20)
CALCIUM BLD-MCNC: 8.3 MG/DL (ref 8.5–10.1)
CHLORIDE SERPL-SCNC: 109 MMOL/L (ref 98–112)
CO2 SERPL-SCNC: 25 MMOL/L (ref 21–32)
CREAT BLD-MCNC: 0.98 MG/DL
DEPRECATED RDW RBC AUTO: 52 FL (ref 35.1–46.3)
EOSINOPHIL # BLD AUTO: 0.03 X10(3) UL (ref 0–0.7)
EOSINOPHIL NFR BLD AUTO: 0.3 %
ERYTHROCYTE [DISTWIDTH] IN BLOOD BY AUTOMATED COUNT: 15.9 % (ref 11–15)
GLUCOSE BLD-MCNC: 105 MG/DL (ref 70–99)
HCT VFR BLD AUTO: 41.3 %
HGB BLD-MCNC: 13.9 G/DL
IMM GRANULOCYTES # BLD AUTO: 0.05 X10(3) UL (ref 0–1)
IMM GRANULOCYTES NFR BLD: 0.5 %
LYMPHOCYTES # BLD AUTO: 1.31 X10(3) UL (ref 1–4)
LYMPHOCYTES NFR BLD AUTO: 14.2 %
MCH RBC QN AUTO: 30 PG (ref 26–34)
MCHC RBC AUTO-ENTMCNC: 33.7 G/DL (ref 31–37)
MCV RBC AUTO: 89.2 FL
MONOCYTES # BLD AUTO: 0.85 X10(3) UL (ref 0.1–1)
MONOCYTES NFR BLD AUTO: 9.2 %
NEUTROPHILS # BLD AUTO: 6.96 X10 (3) UL (ref 1.5–7.7)
NEUTROPHILS # BLD AUTO: 6.96 X10(3) UL (ref 1.5–7.7)
NEUTROPHILS NFR BLD AUTO: 75.6 %
OSMOLALITY SERPL CALC.SUM OF ELEC: 288 MOSM/KG (ref 275–295)
PLATELET # BLD AUTO: 142 10(3)UL (ref 150–450)
POTASSIUM SERPL-SCNC: 4.2 MMOL/L (ref 3.5–5.1)
RBC # BLD AUTO: 4.63 X10(6)UL
SODIUM SERPL-SCNC: 137 MMOL/L (ref 136–145)
WBC # BLD AUTO: 9.2 X10(3) UL (ref 4–11)

## 2022-03-12 PROCEDURE — 80048 BASIC METABOLIC PNL TOTAL CA: CPT | Performed by: STUDENT IN AN ORGANIZED HEALTH CARE EDUCATION/TRAINING PROGRAM

## 2022-03-12 PROCEDURE — 71260 CT THORAX DX C+: CPT | Performed by: STUDENT IN AN ORGANIZED HEALTH CARE EDUCATION/TRAINING PROGRAM

## 2022-03-12 PROCEDURE — 97162 PT EVAL MOD COMPLEX 30 MIN: CPT

## 2022-03-12 PROCEDURE — 97530 THERAPEUTIC ACTIVITIES: CPT

## 2022-03-12 PROCEDURE — 97116 GAIT TRAINING THERAPY: CPT

## 2022-03-12 PROCEDURE — 85025 COMPLETE CBC W/AUTO DIFF WBC: CPT | Performed by: STUDENT IN AN ORGANIZED HEALTH CARE EDUCATION/TRAINING PROGRAM

## 2022-03-12 NOTE — CM/SW NOTE
SW informed pt will need SNF placement at discharge. Pt currently resides at [facility]. SW uploaded clinicals to current referral to [facility] in Aidin and requested DON screen via DCSS. ANGE/CM to remain available for support and/or discharge planning.      SANDHYA Mason, [facility]  Social Work   LQG:#[phone]

## 2022-03-12 NOTE — PLAN OF CARE
Problem: Patient Centered Care  Goal: Patient preferences are identified and integrated in the patient's plan of care  Description: Interventions:  - What would you like us to know as we care for you? From [facility]  - Provide timely, complete, and accurate information to patient/family  - Incorporate patient and family knowledge, values, beliefs, and cultural backgrounds into the planning and delivery of care  - Encourage patient/family to participate in care and decision-making at the level they choose  - Honor patient and family perspectives and choices  Outcome: Progressing     Problem: CARDIOVASCULAR - ADULT  Goal: Maintains optimal cardiac output and hemodynamic stability  Description: INTERVENTIONS:  - Monitor vital signs, rhythm, and trends  - Monitor for bleeding, hypotension and signs of decreased cardiac output  - Evaluate effectiveness of vasoactive medications to optimize hemodynamic stability  - Monitor arterial and/or venous puncture sites for bleeding and/or hematoma  - Assess quality of pulses, skin color and temperature  - Assess for signs of decreased coronary artery perfusion - ex.  Angina  - Evaluate fluid balance, assess for edema, trend weights  Outcome: Progressing  Goal: Absence of cardiac arrhythmias or at baseline  Description: INTERVENTIONS:  - Continuous cardiac monitoring, monitor vital signs, obtain 12 lead EKG if indicated  - Evaluate effectiveness of antiarrhythmic and heart rate control medications as ordered  - Initiate emergency measures for life threatening arrhythmias  - Monitor electrolytes and administer replacement therapy as ordered  Outcome: Progressing     Problem: NEUROLOGICAL - ADULT  Goal: Achieves stable or improved neurological status  Description: INTERVENTIONS  - Assess for and report changes in neurological status  - Initiate measures to prevent increased intracranial pressure  - Maintain blood pressure and fluid volume within ordered parameters to optimize cerebral perfusion and minimize risk of hemorrhage  - Monitor temperature, glucose, and sodium.  Initiate appropriate interventions as ordered  Outcome: Progressing  Goal: Achieves maximal functionality and self care  Description: INTERVENTIONS  - Monitor swallowing and airway patency with patient fatigue and changes in neurological status  - Encourage and assist patient to increase activity and self care with guidance from PT/OT  - Encourage visually impaired, hearing impaired and aphasic patients to use assistive/communication devices  Outcome: Progressing

## 2022-03-12 NOTE — PLAN OF CARE
Patient sitting up in chair this afternoon. Confused. Continuing IV fluids. Fall precautions in place. Call light in reach. Problem: Patient Centered Care  Goal: Patient preferences are identified and integrated in the patient's plan of care  Description: Interventions:  - What would you like us to know as we care for you? From [facility]  - Provide timely, complete, and accurate information to patient/family  - Incorporate patient and family knowledge, values, beliefs, and cultural backgrounds into the planning and delivery of care  - Encourage patient/family to participate in care and decision-making at the level they choose  - Honor patient and family perspectives and choices  Outcome: Progressing     Problem: CARDIOVASCULAR - ADULT  Goal: Maintains optimal cardiac output and hemodynamic stability  Description: INTERVENTIONS:  - Monitor vital signs, rhythm, and trends  - Monitor for bleeding, hypotension and signs of decreased cardiac output  - Evaluate effectiveness of vasoactive medications to optimize hemodynamic stability  - Monitor arterial and/or venous puncture sites for bleeding and/or hematoma  - Assess quality of pulses, skin color and temperature  - Assess for signs of decreased coronary artery perfusion - ex.  Angina  - Evaluate fluid balance, assess for edema, trend weights  Outcome: Progressing  Goal: Absence of cardiac arrhythmias or at baseline  Description: INTERVENTIONS:  - Continuous cardiac monitoring, monitor vital signs, obtain 12 lead EKG if indicated  - Evaluate effectiveness of antiarrhythmic and heart rate control medications as ordered  - Initiate emergency measures for life threatening arrhythmias  - Monitor electrolytes and administer replacement therapy as ordered  Outcome: Progressing     Problem: NEUROLOGICAL - ADULT  Goal: Achieves stable or improved neurological status  Description: INTERVENTIONS  - Assess for and report changes in neurological status  - Initiate measures to prevent increased intracranial pressure  - Maintain blood pressure and fluid volume within ordered parameters to optimize cerebral perfusion and minimize risk of hemorrhage  - Monitor temperature, glucose, and sodium.  Initiate appropriate interventions as ordered  Outcome: Progressing  Goal: Absence of seizures  Description: INTERVENTIONS  - Monitor for seizure activity  - Administer anti-seizure medications as ordered  - Monitor neurological status  Outcome: Progressing  Goal: Remains free of injury related to seizure activity  Description: INTERVENTIONS:  - Maintain airway, patient safety  and administer oxygen as ordered  - Monitor patient for seizure activity, document and report duration and description of seizure to MD/LIP  - If seizure occurs, turn patient to side and suction secretions as needed  - Reorient patient post seizure  - Seizure pads on all 4 side rails  - Instruct patient/family to notify RN of any seizure activity  - Instruct patient/family to call for assistance with activity based on assessment  Outcome: Progressing  Goal: Achieves maximal functionality and self care  Description: INTERVENTIONS  - Monitor swallowing and airway patency with patient fatigue and changes in neurological status  - Encourage and assist patient to increase activity and self care with guidance from PT/OT  - Encourage visually impaired, hearing impaired and aphasic patients to use assistive/communication devices  Outcome: Progressing

## 2022-03-13 VITALS
BODY MASS INDEX: 22.39 KG/M2 | RESPIRATION RATE: 16 BRPM | SYSTOLIC BLOOD PRESSURE: 149 MMHG | HEIGHT: 72 IN | TEMPERATURE: 98 F | WEIGHT: 165.31 LBS | OXYGEN SATURATION: 94 % | HEART RATE: 92 BPM | DIASTOLIC BLOOD PRESSURE: 90 MMHG

## 2022-03-13 LAB
ANION GAP SERPL CALC-SCNC: 4 MMOL/L (ref 0–18)
BASOPHILS # BLD AUTO: 0.04 X10(3) UL (ref 0–0.2)
BASOPHILS NFR BLD AUTO: 0.3 %
BUN BLD-MCNC: 18 MG/DL (ref 7–18)
CALCIUM BLD-MCNC: 8.7 MG/DL (ref 8.5–10.1)
CHLORIDE SERPL-SCNC: 106 MMOL/L (ref 98–112)
CO2 SERPL-SCNC: 30 MMOL/L (ref 21–32)
CREAT BLD-MCNC: 1.09 MG/DL
DEPRECATED RDW RBC AUTO: 52.9 FL (ref 35.1–46.3)
EOSINOPHIL # BLD AUTO: 0.12 X10(3) UL (ref 0–0.7)
EOSINOPHIL NFR BLD AUTO: 1 %
ERYTHROCYTE [DISTWIDTH] IN BLOOD BY AUTOMATED COUNT: 15.9 % (ref 11–15)
GLUCOSE BLD-MCNC: 97 MG/DL (ref 70–99)
HGB BLD-MCNC: 13.4 G/DL
IMM GRANULOCYTES # BLD AUTO: 0.06 X10(3) UL (ref 0–1)
IMM GRANULOCYTES NFR BLD: 0.5 %
LYMPHOCYTES # BLD AUTO: 1.94 X10(3) UL (ref 1–4)
LYMPHOCYTES NFR BLD AUTO: 15.9 %
MCH RBC QN AUTO: 29.5 PG (ref 26–34)
MCHC RBC AUTO-ENTMCNC: 32.4 G/DL (ref 31–37)
MCV RBC AUTO: 91 FL
MONOCYTES # BLD AUTO: 1.25 X10(3) UL (ref 0.1–1)
MONOCYTES NFR BLD AUTO: 10.3 %
NEUTROPHILS # BLD AUTO: 8.76 X10 (3) UL (ref 1.5–7.7)
NEUTROPHILS # BLD AUTO: 8.76 X10(3) UL (ref 1.5–7.7)
NEUTROPHILS NFR BLD AUTO: 72 %
OSMOLALITY SERPL CALC.SUM OF ELEC: 292 MOSM/KG (ref 275–295)
PLATELET # BLD AUTO: 143 10(3)UL (ref 150–450)
POTASSIUM SERPL-SCNC: 3.9 MMOL/L (ref 3.5–5.1)
RBC # BLD AUTO: 4.54 X10(6)UL
SODIUM SERPL-SCNC: 140 MMOL/L (ref 136–145)
WBC # BLD AUTO: 12.2 X10(3) UL (ref 4–11)

## 2022-03-13 PROCEDURE — 85025 COMPLETE CBC W/AUTO DIFF WBC: CPT | Performed by: STUDENT IN AN ORGANIZED HEALTH CARE EDUCATION/TRAINING PROGRAM

## 2022-03-13 PROCEDURE — 80048 BASIC METABOLIC PNL TOTAL CA: CPT | Performed by: STUDENT IN AN ORGANIZED HEALTH CARE EDUCATION/TRAINING PROGRAM

## 2022-03-13 NOTE — PLAN OF CARE
Patient remains alert to self and very aggressive towards this writer. Patient slept up oob in recliner this shift. LR infusing @ 100cc/hr. All care provided by staff. No s/s of distress noted at this time. All safety measures in place. Will continue to monitor. Problem: Patient Centered Care  Goal: Patient preferences are identified and integrated in the patient's plan of care  Description: Interventions:  - What would you like us to know as we care for you? From [facility]  - Provide timely, complete, and accurate information to patient/family  - Incorporate patient and family knowledge, values, beliefs, and cultural backgrounds into the planning and delivery of care  - Encourage patient/family to participate in care and decision-making at the level they choose  - Honor patient and family perspectives and choices  Outcome: Progressing     Problem: CARDIOVASCULAR - ADULT  Goal: Maintains optimal cardiac output and hemodynamic stability  Description: INTERVENTIONS:  - Monitor vital signs, rhythm, and trends  - Monitor for bleeding, hypotension and signs of decreased cardiac output  - Evaluate effectiveness of vasoactive medications to optimize hemodynamic stability  - Monitor arterial and/or venous puncture sites for bleeding and/or hematoma  - Assess quality of pulses, skin color and temperature  - Assess for signs of decreased coronary artery perfusion - ex.  Angina  - Evaluate fluid balance, assess for edema, trend weights  Outcome: Progressing  Goal: Absence of cardiac arrhythmias or at baseline  Description: INTERVENTIONS:  - Continuous cardiac monitoring, monitor vital signs, obtain 12 lead EKG if indicated  - Evaluate effectiveness of antiarrhythmic and heart rate control medications as ordered  - Initiate emergency measures for life threatening arrhythmias  - Monitor electrolytes and administer replacement therapy as ordered  Outcome: Progressing     Problem: NEUROLOGICAL - ADULT  Goal: Achieves stable or improved neurological status  Description: INTERVENTIONS  - Assess for and report changes in neurological status  - Initiate measures to prevent increased intracranial pressure  - Maintain blood pressure and fluid volume within ordered parameters to optimize cerebral perfusion and minimize risk of hemorrhage  - Monitor temperature, glucose, and sodium.  Initiate appropriate interventions as ordered  Outcome: Progressing  Goal: Absence of seizures  Description: INTERVENTIONS  - Monitor for seizure activity  - Administer anti-seizure medications as ordered  - Monitor neurological status  Outcome: Progressing  Goal: Remains free of injury related to seizure activity  Description: INTERVENTIONS:  - Maintain airway, patient safety  and administer oxygen as ordered  - Monitor patient for seizure activity, document and report duration and description of seizure to MD/LIP  - If seizure occurs, turn patient to side and suction secretions as needed  - Reorient patient post seizure  - Seizure pads on all 4 side rails  - Instruct patient/family to notify RN of any seizure activity  - Instruct patient/family to call for assistance with activity based on assessment  Outcome: Progressing  Goal: Achieves maximal functionality and self care  Description: INTERVENTIONS  - Monitor swallowing and airway patency with patient fatigue and changes in neurological status  - Encourage and assist patient to increase activity and self care with guidance from PT/OT  - Encourage visually impaired, hearing impaired and aphasic patients to use assistive/communication devices  Outcome: Progressing

## 2022-03-13 NOTE — PROGRESS NOTES
Report called to Manisha Cisneros at [facility], all questions answered. Called and informed patient's daughter Bharti Christopher that patient will be discharged at 1400 to [facility]. All questions answered.

## 2022-03-13 NOTE — CM/SW NOTE
03/13/22 1100   Discharge disposition   Expected discharge disposition [facility] Provider [address]   Discharge transportation [address] approved 2pm discharge. RN to inform family of discharge.     RN to call report to [phone]  Room 146 Bed 1 Unit SANDHYA Sherman, [facility]  Social Work   MMN:#[id number]

## 2022-03-13 NOTE — PLAN OF CARE
Problem: Patient Centered Care  Goal: Patient preferences are identified and integrated in the patient's plan of care  Description: Interventions:  - What would you like us to know as we care for you? From [facility]  - Provide timely, complete, and accurate information to patient/family  - Incorporate patient and family knowledge, values, beliefs, and cultural backgrounds into the planning and delivery of care  - Encourage patient/family to participate in care and decision-making at the level they choose  - Honor patient and family perspectives and choices  Outcome: Adequate for Discharge     Problem: CARDIOVASCULAR - ADULT  Goal: Maintains optimal cardiac output and hemodynamic stability  Description: INTERVENTIONS:  - Monitor vital signs, rhythm, and trends  - Monitor for bleeding, hypotension and signs of decreased cardiac output  - Evaluate effectiveness of vasoactive medications to optimize hemodynamic stability  - Monitor arterial and/or venous puncture sites for bleeding and/or hematoma  - Assess quality of pulses, skin color and temperature  - Assess for signs of decreased coronary artery perfusion - ex.  Angina  - Evaluate fluid balance, assess for edema, trend weights  Outcome: Adequate for Discharge  Goal: Absence of cardiac arrhythmias or at baseline  Description: INTERVENTIONS:  - Continuous cardiac monitoring, monitor vital signs, obtain 12 lead EKG if indicated  - Evaluate effectiveness of antiarrhythmic and heart rate control medications as ordered  - Initiate emergency measures for life threatening arrhythmias  - Monitor electrolytes and administer replacement therapy as ordered  Outcome: Adequate for Discharge     Problem: NEUROLOGICAL - ADULT  Goal: Achieves stable or improved neurological status  Description: INTERVENTIONS  - Assess for and report changes in neurological status  - Initiate measures to prevent increased intracranial pressure  - Maintain blood pressure and fluid volume within ordered parameters to optimize cerebral perfusion and minimize risk of hemorrhage  - Monitor temperature, glucose, and sodium.  Initiate appropriate interventions as ordered  Outcome: Adequate for Discharge  Goal: Absence of seizures  Description: INTERVENTIONS  - Monitor for seizure activity  - Administer anti-seizure medications as ordered  - Monitor neurological status  Outcome: Adequate for Discharge  Goal: Remains free of injury related to seizure activity  Description: INTERVENTIONS:  - Maintain airway, patient safety  and administer oxygen as ordered  - Monitor patient for seizure activity, document and report duration and description of seizure to MD/LIP  - If seizure occurs, turn patient to side and suction secretions as needed  - Reorient patient post seizure  - Seizure pads on all 4 side rails  - Instruct patient/family to notify RN of any seizure activity  - Instruct patient/family to call for assistance with activity based on assessment  Outcome: Adequate for Discharge  Goal: Achieves maximal functionality and self care  Description: INTERVENTIONS  - Monitor swallowing and airway patency with patient fatigue and changes in neurological status  - Encourage and assist patient to increase activity and self care with guidance from PT/OT  - Encourage visually impaired, hearing impaired and aphasic patients to use assistive/communication devices  Outcome: Adequate for Discharge

## 2022-04-21 ENCOUNTER — LAB ENCOUNTER (OUTPATIENT)
Dept: LAB | Facility: HOSPITAL | Age: 87
End: 2022-04-21
Payer: MEDICARE

## 2022-04-21 DIAGNOSIS — I10 HTN (HYPERTENSION): ICD-10-CM

## 2022-04-21 DIAGNOSIS — E53.8 B12 DEFICIENCY: ICD-10-CM

## 2022-04-21 DIAGNOSIS — N18.9 CKD (CHRONIC KIDNEY DISEASE): ICD-10-CM

## 2022-04-21 DIAGNOSIS — E05.90 HYPERTHYROIDISM: Primary | ICD-10-CM

## 2022-04-21 DIAGNOSIS — D64.9 ANEMIA: ICD-10-CM

## 2022-04-21 LAB
ALBUMIN SERPL-MCNC: 3.3 G/DL (ref 3.4–5)
ALBUMIN/GLOB SERPL: 0.9 {RATIO} (ref 1–2)
ALP LIVER SERPL-CCNC: 83 U/L
ALT SERPL-CCNC: 13 U/L
ANION GAP SERPL CALC-SCNC: 4 MMOL/L (ref 0–18)
AST SERPL-CCNC: 11 U/L (ref 15–37)
BASOPHILS # BLD AUTO: 0.04 X10(3) UL (ref 0–0.2)
BASOPHILS NFR BLD AUTO: 0.7 %
BILIRUB SERPL-MCNC: 0.5 MG/DL (ref 0.1–2)
BUN BLD-MCNC: 17 MG/DL (ref 7–18)
BUN/CREAT SERPL: 13.5 (ref 10–20)
CALCIUM BLD-MCNC: 9.7 MG/DL (ref 8.5–10.1)
CHLORIDE SERPL-SCNC: 109 MMOL/L (ref 98–112)
CO2 SERPL-SCNC: 29 MMOL/L (ref 21–32)
CREAT BLD-MCNC: 1.26 MG/DL
DEPRECATED RDW RBC AUTO: 54.3 FL (ref 35.1–46.3)
EOSINOPHIL # BLD AUTO: 0.08 X10(3) UL (ref 0–0.7)
EOSINOPHIL NFR BLD AUTO: 1.4 %
ERYTHROCYTE [DISTWIDTH] IN BLOOD BY AUTOMATED COUNT: 15.9 % (ref 11–15)
FASTING STATUS PATIENT QL REPORTED: YES
GLOBULIN PLAS-MCNC: 3.5 G/DL (ref 2.8–4.4)
GLUCOSE BLD-MCNC: 79 MG/DL (ref 70–99)
HCT VFR BLD AUTO: 43.7 %
HGB BLD-MCNC: 14.2 G/DL
IMM GRANULOCYTES # BLD AUTO: 0.02 X10(3) UL (ref 0–1)
IMM GRANULOCYTES NFR BLD: 0.3 %
LYMPHOCYTES # BLD AUTO: 2.09 X10(3) UL (ref 1–4)
LYMPHOCYTES NFR BLD AUTO: 35.8 %
MCH RBC QN AUTO: 30.3 PG (ref 26–34)
MCHC RBC AUTO-ENTMCNC: 32.5 G/DL (ref 31–37)
MCV RBC AUTO: 93.4 FL
MONOCYTES # BLD AUTO: 0.47 X10(3) UL (ref 0.1–1)
MONOCYTES NFR BLD AUTO: 8.1 %
NEUTROPHILS # BLD AUTO: 3.13 X10 (3) UL (ref 1.5–7.7)
NEUTROPHILS # BLD AUTO: 3.13 X10(3) UL (ref 1.5–7.7)
NEUTROPHILS NFR BLD AUTO: 53.7 %
OSMOLALITY SERPL CALC.SUM OF ELEC: 294 MOSM/KG (ref 275–295)
PLATELET # BLD AUTO: 234 10(3)UL (ref 150–450)
POTASSIUM SERPL-SCNC: 4.2 MMOL/L (ref 3.5–5.1)
PROT SERPL-MCNC: 6.8 G/DL (ref 6.4–8.2)
RBC # BLD AUTO: 4.68 X10(6)UL
SODIUM SERPL-SCNC: 142 MMOL/L (ref 136–145)
T3FREE SERPL-MCNC: 1.83 PG/ML (ref 2.4–4.2)
T4 FREE SERPL-MCNC: 1.1 NG/DL (ref 0.8–1.7)
TSI SER-ACNC: 0.62 MIU/ML (ref 0.36–3.74)
VIT B12 SERPL-MCNC: 1087 PG/ML (ref 193–986)
WBC # BLD AUTO: 5.8 X10(3) UL (ref 4–11)

## 2022-04-21 PROCEDURE — 85025 COMPLETE CBC W/AUTO DIFF WBC: CPT

## 2022-04-21 PROCEDURE — 84481 FREE ASSAY (FT-3): CPT

## 2022-04-21 PROCEDURE — 84443 ASSAY THYROID STIM HORMONE: CPT

## 2022-04-21 PROCEDURE — 80053 COMPREHEN METABOLIC PANEL: CPT

## 2022-04-21 PROCEDURE — 36415 COLL VENOUS BLD VENIPUNCTURE: CPT

## 2022-04-21 PROCEDURE — 84439 ASSAY OF FREE THYROXINE: CPT

## 2022-04-21 PROCEDURE — 82607 VITAMIN B-12: CPT

## 2022-05-19 ENCOUNTER — LAB ENCOUNTER (OUTPATIENT)
Dept: LAB | Facility: HOSPITAL | Age: 87
End: 2022-05-19
Payer: MEDICARE

## 2022-05-19 DIAGNOSIS — I50.9 CHF (CONGESTIVE HEART FAILURE) (HCC): Primary | ICD-10-CM

## 2022-05-19 LAB
ANION GAP SERPL CALC-SCNC: 6 MMOL/L (ref 0–18)
BUN BLD-MCNC: 22 MG/DL (ref 7–18)
BUN/CREAT SERPL: 17.9 (ref 10–20)
CALCIUM BLD-MCNC: 9.3 MG/DL (ref 8.5–10.1)
CHLORIDE SERPL-SCNC: 103 MMOL/L (ref 98–112)
CO2 SERPL-SCNC: 30 MMOL/L (ref 21–32)
CREAT BLD-MCNC: 1.23 MG/DL
FASTING STATUS PATIENT QL REPORTED: YES
GLUCOSE BLD-MCNC: 92 MG/DL (ref 70–99)
MAGNESIUM SERPL-MCNC: 2.2 MG/DL (ref 1.6–2.6)
OSMOLALITY SERPL CALC.SUM OF ELEC: 291 MOSM/KG (ref 275–295)
POTASSIUM SERPL-SCNC: 3.9 MMOL/L (ref 3.5–5.1)
SODIUM SERPL-SCNC: 139 MMOL/L (ref 136–145)

## 2022-05-19 PROCEDURE — 80048 BASIC METABOLIC PNL TOTAL CA: CPT

## 2022-05-19 PROCEDURE — 83735 ASSAY OF MAGNESIUM: CPT

## 2022-05-24 ENCOUNTER — APPOINTMENT (OUTPATIENT)
Dept: CT IMAGING | Facility: HOSPITAL | Age: 87
End: 2022-05-24
Attending: EMERGENCY MEDICINE
Payer: MEDICARE

## 2022-05-24 ENCOUNTER — HOSPITAL ENCOUNTER (EMERGENCY)
Facility: HOSPITAL | Age: 87
Discharge: HOME | End: 2022-05-24
Attending: EMERGENCY MEDICINE
Payer: MEDICARE

## 2022-05-24 VITALS
HEART RATE: 51 BPM | OXYGEN SATURATION: 99 % | BODY MASS INDEX: 22 KG/M2 | DIASTOLIC BLOOD PRESSURE: 75 MMHG | RESPIRATION RATE: 16 BRPM | SYSTOLIC BLOOD PRESSURE: 143 MMHG | WEIGHT: 165 LBS | TEMPERATURE: 97 F

## 2022-05-24 DIAGNOSIS — R53.1 WEAKNESS GENERALIZED: Primary | ICD-10-CM

## 2022-05-24 LAB
ANION GAP SERPL CALC-SCNC: 1 MMOL/L (ref 0–18)
BASOPHILS # BLD AUTO: 0.06 X10(3) UL (ref 0–0.2)
BASOPHILS NFR BLD AUTO: 1 %
BILIRUB UR QL: NEGATIVE
BUN BLD-MCNC: 26 MG/DL (ref 7–18)
BUN/CREAT SERPL: 19 (ref 10–20)
CALCIUM BLD-MCNC: 9.8 MG/DL (ref 8.5–10.1)
CHLORIDE SERPL-SCNC: 109 MMOL/L (ref 98–112)
CO2 SERPL-SCNC: 30 MMOL/L (ref 21–32)
COLOR UR: YELLOW
CREAT BLD-MCNC: 1.37 MG/DL
DEPRECATED RDW RBC AUTO: 54.3 FL (ref 35.1–46.3)
EOSINOPHIL # BLD AUTO: 0.23 X10(3) UL (ref 0–0.7)
EOSINOPHIL NFR BLD AUTO: 3.8 %
ERYTHROCYTE [DISTWIDTH] IN BLOOD BY AUTOMATED COUNT: 15.7 % (ref 11–15)
GLUCOSE BLD-MCNC: 97 MG/DL (ref 70–99)
GLUCOSE BLDC GLUCOMTR-MCNC: 77 MG/DL (ref 70–99)
GLUCOSE UR-MCNC: NEGATIVE MG/DL
HCT VFR BLD AUTO: 41.5 %
HGB BLD-MCNC: 13.5 G/DL
HGB UR QL STRIP.AUTO: NEGATIVE
HYALINE CASTS #/AREA URNS AUTO: PRESENT /LPF
IMM GRANULOCYTES # BLD AUTO: 0.03 X10(3) UL (ref 0–1)
IMM GRANULOCYTES NFR BLD: 0.5 %
KETONES UR-MCNC: NEGATIVE MG/DL
LEUKOCYTE ESTERASE UR QL STRIP.AUTO: NEGATIVE
LYMPHOCYTES # BLD AUTO: 1.7 X10(3) UL (ref 1–4)
LYMPHOCYTES NFR BLD AUTO: 28 %
MCH RBC QN AUTO: 30.3 PG (ref 26–34)
MCHC RBC AUTO-ENTMCNC: 32.5 G/DL (ref 31–37)
MCV RBC AUTO: 93 FL
MONOCYTES # BLD AUTO: 0.65 X10(3) UL (ref 0.1–1)
MONOCYTES NFR BLD AUTO: 10.7 %
NEUTROPHILS # BLD AUTO: 3.4 X10 (3) UL (ref 1.5–7.7)
NEUTROPHILS # BLD AUTO: 3.4 X10(3) UL (ref 1.5–7.7)
NEUTROPHILS NFR BLD AUTO: 56 %
NITRITE UR QL STRIP.AUTO: NEGATIVE
OSMOLALITY SERPL CALC.SUM OF ELEC: 295 MOSM/KG (ref 275–295)
PH UR: 6 [PH] (ref 5–8)
PLATELET # BLD AUTO: 200 10(3)UL (ref 150–450)
POTASSIUM SERPL-SCNC: 5 MMOL/L (ref 3.5–5.1)
PROT UR-MCNC: NEGATIVE MG/DL
RBC # BLD AUTO: 4.46 X10(6)UL
RBC #/AREA URNS AUTO: >10 /HPF
SARS-COV-2 RNA RESP QL NAA+PROBE: NOT DETECTED
SODIUM SERPL-SCNC: 140 MMOL/L (ref 136–145)
SP GR UR STRIP: 1.02 (ref 1–1.03)
T3FREE SERPL-MCNC: 2 PG/ML (ref 2.4–4.2)
T4 FREE SERPL-MCNC: 1.2 NG/DL (ref 0.8–1.7)
TSI SER-ACNC: 0.32 MIU/ML (ref 0.36–3.74)
UROBILINOGEN UR STRIP-ACNC: 4
VIT C UR-MCNC: 20 MG/DL
WBC # BLD AUTO: 6.1 X10(3) UL (ref 4–11)
WBC CLUMPS UR QL AUTO: PRESENT /HPF

## 2022-05-24 PROCEDURE — 93005 ELECTROCARDIOGRAM TRACING: CPT

## 2022-05-24 PROCEDURE — 84439 ASSAY OF FREE THYROXINE: CPT | Performed by: EMERGENCY MEDICINE

## 2022-05-24 PROCEDURE — 93010 ELECTROCARDIOGRAM REPORT: CPT | Performed by: EMERGENCY MEDICINE

## 2022-05-24 PROCEDURE — 82962 GLUCOSE BLOOD TEST: CPT

## 2022-05-24 PROCEDURE — 96360 HYDRATION IV INFUSION INIT: CPT

## 2022-05-24 PROCEDURE — 84481 FREE ASSAY (FT-3): CPT | Performed by: EMERGENCY MEDICINE

## 2022-05-24 PROCEDURE — 84443 ASSAY THYROID STIM HORMONE: CPT | Performed by: EMERGENCY MEDICINE

## 2022-05-24 PROCEDURE — 70450 CT HEAD/BRAIN W/O DYE: CPT | Performed by: EMERGENCY MEDICINE

## 2022-05-24 PROCEDURE — 80048 BASIC METABOLIC PNL TOTAL CA: CPT | Performed by: EMERGENCY MEDICINE

## 2022-05-24 PROCEDURE — 99285 EMERGENCY DEPT VISIT HI MDM: CPT

## 2022-05-24 PROCEDURE — 96361 HYDRATE IV INFUSION ADD-ON: CPT

## 2022-05-24 PROCEDURE — 81001 URINALYSIS AUTO W/SCOPE: CPT | Performed by: EMERGENCY MEDICINE

## 2022-05-24 PROCEDURE — 85025 COMPLETE CBC W/AUTO DIFF WBC: CPT | Performed by: EMERGENCY MEDICINE

## 2022-05-24 RX ORDER — SODIUM CHLORIDE 9 MG/ML
125 INJECTION, SOLUTION INTRAVENOUS CONTINUOUS
Status: DISCONTINUED | OUTPATIENT
Start: 2022-05-24 | End: 2022-05-24

## 2022-05-24 NOTE — ED QUICK NOTES
Erroneous glucose result for patient - pending removal from chart  Pt is stable, at baseline mental status  Repeat glucose is 77.  Dr Johnny Andrade aware

## 2022-05-24 NOTE — ED INITIAL ASSESSMENT (HPI)
Hellen Bloom arrives via [facility] from [facility] memory care unit for increase in altered mental status and weakness. He was assisted to the ground today due to weakness, did not fall per staff. Patient normally with some dementia, but is more altered today per nursing home staff.

## 2022-05-26 ENCOUNTER — LAB ENCOUNTER (OUTPATIENT)
Dept: LAB | Facility: HOSPITAL | Age: 87
End: 2022-05-26
Payer: MEDICARE

## 2022-05-26 DIAGNOSIS — I10 HTN (HYPERTENSION): Primary | ICD-10-CM

## 2022-05-26 DIAGNOSIS — E05.90 HYPERTHYROIDISM: ICD-10-CM

## 2022-05-26 DIAGNOSIS — F03.90 DEMENTIA (HCC): ICD-10-CM

## 2022-05-26 DIAGNOSIS — D64.9 ANEMIA: ICD-10-CM

## 2022-05-26 DIAGNOSIS — R63.4 WEIGHT LOSS: ICD-10-CM

## 2022-05-26 DIAGNOSIS — R63.0 POOR APPETITE: ICD-10-CM

## 2022-05-26 LAB
ALBUMIN SERPL-MCNC: 3.2 G/DL (ref 3.4–5)
ALBUMIN/GLOB SERPL: 0.8 {RATIO} (ref 1–2)
ALP LIVER SERPL-CCNC: 96 U/L
ALT SERPL-CCNC: 22 U/L
ANION GAP SERPL CALC-SCNC: 9 MMOL/L (ref 0–18)
AST SERPL-CCNC: 17 U/L (ref 15–37)
BASOPHILS # BLD AUTO: 0.06 X10(3) UL (ref 0–0.2)
BASOPHILS NFR BLD AUTO: 0.8 %
BILIRUB SERPL-MCNC: 0.7 MG/DL (ref 0.1–2)
BUN BLD-MCNC: 21 MG/DL (ref 7–18)
BUN/CREAT SERPL: 18.9 (ref 10–20)
CALCIUM BLD-MCNC: 9.2 MG/DL (ref 8.5–10.1)
CHLORIDE SERPL-SCNC: 111 MMOL/L (ref 98–112)
CO2 SERPL-SCNC: 23 MMOL/L (ref 21–32)
CREAT BLD-MCNC: 1.11 MG/DL
DEPRECATED RDW RBC AUTO: 52.5 FL (ref 35.1–46.3)
EOSINOPHIL # BLD AUTO: 0.13 X10(3) UL (ref 0–0.7)
EOSINOPHIL NFR BLD AUTO: 1.8 %
ERYTHROCYTE [DISTWIDTH] IN BLOOD BY AUTOMATED COUNT: 15.5 % (ref 11–15)
FASTING STATUS PATIENT QL REPORTED: YES
GLOBULIN PLAS-MCNC: 4.1 G/DL (ref 2.8–4.4)
GLUCOSE BLD-MCNC: 104 MG/DL (ref 70–99)
HCT VFR BLD AUTO: 42.7 %
HGB BLD-MCNC: 13.7 G/DL
IMM GRANULOCYTES # BLD AUTO: 0.04 X10(3) UL (ref 0–1)
IMM GRANULOCYTES NFR BLD: 0.6 %
LYMPHOCYTES # BLD AUTO: 1.68 X10(3) UL (ref 1–4)
LYMPHOCYTES NFR BLD AUTO: 23.7 %
MCH RBC QN AUTO: 29.5 PG (ref 26–34)
MCHC RBC AUTO-ENTMCNC: 32.1 G/DL (ref 31–37)
MCV RBC AUTO: 92 FL
MONOCYTES # BLD AUTO: 0.68 X10(3) UL (ref 0.1–1)
MONOCYTES NFR BLD AUTO: 9.6 %
NEUTROPHILS # BLD AUTO: 4.49 X10 (3) UL (ref 1.5–7.7)
NEUTROPHILS # BLD AUTO: 4.49 X10(3) UL (ref 1.5–7.7)
NEUTROPHILS NFR BLD AUTO: 63.5 %
OSMOLALITY SERPL CALC.SUM OF ELEC: 299 MOSM/KG (ref 275–295)
PLATELET # BLD AUTO: 259 10(3)UL (ref 150–450)
POTASSIUM SERPL-SCNC: 3.7 MMOL/L (ref 3.5–5.1)
PREALB SERPL-MCNC: 12.8 MG/DL (ref 20–40)
PROT SERPL-MCNC: 7.3 G/DL (ref 6.4–8.2)
RBC # BLD AUTO: 4.64 X10(6)UL
SODIUM SERPL-SCNC: 143 MMOL/L (ref 136–145)
T3 SERPL-MCNC: 51 NG/DL (ref 60–181)
T4 FREE SERPL-MCNC: 1.1 NG/DL (ref 0.8–1.7)
TSI SER-ACNC: 0.8 MIU/ML (ref 0.36–3.74)
WBC # BLD AUTO: 7.1 X10(3) UL (ref 4–11)

## 2022-05-26 PROCEDURE — 84134 ASSAY OF PREALBUMIN: CPT

## 2022-05-26 PROCEDURE — 85025 COMPLETE CBC W/AUTO DIFF WBC: CPT

## 2022-05-26 PROCEDURE — 84480 ASSAY TRIIODOTHYRONINE (T3): CPT

## 2022-05-26 PROCEDURE — 84443 ASSAY THYROID STIM HORMONE: CPT

## 2022-05-26 PROCEDURE — 84439 ASSAY OF FREE THYROXINE: CPT

## 2022-05-26 PROCEDURE — 80053 COMPREHEN METABOLIC PANEL: CPT

## 2022-05-28 ENCOUNTER — HOSPITAL ENCOUNTER (EMERGENCY)
Facility: HOSPITAL | Age: 87
Discharge: HOME | End: 2022-05-28
Attending: EMERGENCY MEDICINE
Payer: MEDICARE

## 2022-05-28 ENCOUNTER — HOSPITAL ENCOUNTER (EMERGENCY)
Facility: HOSPITAL | Age: 87
Discharge: NURSING, REHAB OR LONG-TERM CARE | End: 2022-05-28
Attending: EMERGENCY MEDICINE
Payer: MEDICARE

## 2022-05-28 VITALS
WEIGHT: 170 LBS | OXYGEN SATURATION: 95 % | TEMPERATURE: 98 F | DIASTOLIC BLOOD PRESSURE: 96 MMHG | HEIGHT: 74 IN | SYSTOLIC BLOOD PRESSURE: 139 MMHG | RESPIRATION RATE: 16 BRPM | BODY MASS INDEX: 21.82 KG/M2 | HEART RATE: 75 BPM

## 2022-05-28 DIAGNOSIS — Z13.9 ENCOUNTER FOR MEDICAL SCREENING EXAMINATION: Primary | ICD-10-CM

## 2022-05-28 PROCEDURE — 99283 EMERGENCY DEPT VISIT LOW MDM: CPT

## 2022-05-28 NOTE — ED QUICK NOTES
Pt out of ed via [facility] in no distress.  Pt returning to [facility]

## 2022-05-28 NOTE — ED INITIAL ASSESSMENT (HPI)
Pt presents to the ED via [facility]. Pt coming from [facility] (assisted living) c/o bilateral thigh pain. Per EMS nursing staff had difficulty getting him out of his chair. Hx of dementia.

## (undated) NOTE — IP AVS SNAPSHOT
Patient Demographics     Address  [address] [place] [address] Phone  [phone] [facility]  [phone] [facility] E-mail Address  [email or web address]      Emergency Contact(s)     Name Relation Home Work Mobile    Cheree Frankel Daughter mouth nightly.                    [phone] - MAR ACTION REPORT  (last 24 hrs)    ** SITE UNKNOWN **     Order ID Medication Name Action Time Action Reason Comments    [id number] QUEtiapine (SEROQUEL) tab 25 mg 08/30/21 2040 Given      [id number] donepezil (A CBC, Platelet;  No Differential [[id number]] (Abnormal)  Resulted: 08/31/21 0709, Result status: Final result   Ordering provider: Machelle Dasilva MD  08/30/21 2300 Resulting lab: [facility])    Specimen Information PCP[SA. [address]. 2]     Date:  8/29/2021  Date of Admission:  8/28/2021    Chief Complaint:[SA.1]  Patient presents with:  Fall  Laceration/Abrasion[SA.2]      History of Present Illness:[SA.1]  [address] [place] [address]. 2] is a(n)[SA.1] 85 yea organomegaly. Lymphatics:  No lymphadenopathy neck, axilla, groin. Musculoskeletal: Normal range of motion. normal strength. Feet:  Normal pulses. Neurologic:  no focal deficits, cranial nerves II-XII are grossly intact.   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 5. Intracranial atherosclerosis.   Results of this examination were discussed with the patient's physician, Dr. Merissa Foster, by Dr. Farzaneh Salgado at 9009 on interstitial prominence, which may relate to senescent change or low-grade interstitial edema.     Dictated by (CST): Nica Woo MD on 8/28/2021 at 8:01 PM     Finalized by (CST): Nica Woo MD on 8/28/2021 at 8:03 PM[SA.2]             Assessment by Farhad Vergara MD at 21 [address]    Report of Consultation    Joana Jimenez Patient Status:  Inpatient    1935 MRN [id number]   Location [facility] 2W/SW Attending Farhad Vergara MD   [facility] furosemide 20 MG Oral Tab, Take 20 mg by mouth daily as needed. Magnesium Hydroxide (MILK OF MAGNESIA OR), Take 30 mL by mouth every 4 (four) hours as needed (constipation). multivitamin Oral Tab, Take 1 tablet by mouth daily.   aspirin 81 MG Oral Chew Ta 08/29/2021    BUN 21 (H) 08/29/2021     08/29/2021    K 4.0 08/29/2021     08/29/2021    CO2 25.0 08/29/2021     (H) 08/29/2021    CA 8.3 (L) 08/29/2021    ALB 3.5 05/06/2021    ALKPHO 85 05/06/2021    TP 7.7 05/06/2021    AST 15 05/06/2 Index Registry. FINDINGS: There has been a slight decrease in the density of 5 mm intraparenchymal hematoma/contusion in the right frontal lobe as well as a slight decrease in the density of small subarachnoid hemorrhage in the left frontal lobe.   No luann volume subarachnoid hemorrhage is also noted within the sulci of the left frontal lobe. Encephalomalacia involving the right anterior frontal lobe.  Scattered hypodense foci noted in the subcortical and periventricular white matter of both cerebral hemisph PM          CT FACIAL BONES (TNW=61568)    Result Date: 8/28/2021  PROCEDURE: CT FACIAL BONES (CPT=70486)  COMPARISON: None. INDICATIONS: S/p fall, laceration to face, h/o dementia.   TECHNIQUE: Multidetector CT images centered about the maxillofacial stru additional acute fracture or dislocation of the maxillofacial bones. There are postsurgical changes/a cerclage wire at the level of the left mandibular angle.  3. Multifocal paranasal sinus mucosal thickening, which is most pronounced within the ethmoid an atherosclerosis. 4. Low-density 1.6 cm right thyroid nodule. 5. Biapical emphysema.    Dictated by (CST): Meghna Adams MD on 8/28/2021 at 7:08 PM     Finalized by (CST): Meghna Adams MD on 8/28/2021 at 7:11 PM          CARD ECHO 2D DOPPLER (CPT=93306) available for comparison. Study status:  Elective. Procedure:  Transthoracic echocardiography. The study was technically limited due to poor acoustic window availability and body habitus.  Scanning was performed from the parasternal, apical, and subcostal normal in size. Pericardium:  There was no pericardial effusion. Quality of study:  Image quality was poor. Systemic veins: Inferior vena cava: The vessel was normal in size.  The respirophasic diameter changes were in the normal range (= 50%), consistent w Reference  Aortic root ID                              3.5   cm     &lt;4.1   Left atrium                                 Value        Reference  LA ID, A-P, ES                              3.6   cm     3.0 - 4.0   Mitral valve evident. Mild diffuse interstitial prominence. BONES: Mild degenerative changes of the thoracic spine are apparent. Demineralization. OTHER:   Negative. CONCLUSION:  1. No focal airspace disease or significant pleural effusion.  2. Borderline car Version 1 of 1    Author: Chelle Mackenzie PT Service: Rehab Author Type: Physical Therapist    Filed: 8/30/2021  1:04 PM Date of Service: 8/30/2021  9:05 AM Status: Signed    : Chelle Mackenzie PT (Physical Therapist)        PHYSICAL THERAPY Yakelin Campbell that patients with this level of impairment may benefit from sub-acute rehab. Spoke to Corby Nuñez in person. Anticipate that return to 24/7 care at [facility] as he would likely thrive better in his home environment.  Recommend home PT at [facility] Home Layout: One level  Stairs to Enter : 0     Stairs to Bedroom: 0       Lives With: Staff 24 hours[ST. 2]             Prior Level of [place]: Per chart review, pt is ambulatory and was even running just prior to his fall at the [facility].     SUBJECTIVE  \ wheelchair)?: A Little   -   Need to walk in hospital room?: A Little   -   Climbing 3-5 steps with a railing?: A Lot[ST. 2]     AM-PAC Score:[ST.1]  Raw Score: 16   Approx Degree of Impairment: 54.16%   Standardized Score (AM-PAC Scale): 40.78   CMS Modifi 1    Author: Alona Joseph OT Service: Rehab Author Type: Occupational Therapist    Filed: 8/30/2021  1:06 PM Date of Service: 8/30/2021  9:23 AM Status: Signed    : Alona Joseph OT (Occupational Therapist)       OCCUPATIONAL T he more easily initiates mobility and ADL tasks.      To assess pt's safe participation during daily tasks while also providing intermittent education to maximize safety and participation, therapist facilitated the following: bed mobility with Mod A, transf when he fell     SUBJECTIVE  Agreeable to activity  \"be careful. don't trip on that , babe! \"    OCCUPATIONAL THERAPY EXAMINATION      OBJECTIVE[KH.1]  Precautions: Bed/chair alarm[KH.2]       PAIN ASSESSMENT[KH.1]  Rating: Unable to rate  Location: left Patient will complete toileting with SPV  Comment:     Patient will tolerate standing for 3 minutes in prep for adls with SPV   Comment:    Patient will complete item retrieval with SPV  Comment:          Goals  on: 2021  Frequency: 3-5x/wk[KH. 1 liquids. Pt with delayed throat clear with thin liquids. Trials discontinued at this time. Oral/buccal cavities clear of residue following all trials. Oxygen status remained >96 t/o the entire evaluation.      At this time, pt presents with mild-moderate or CONCLUSION:   1. Decreased density of right frontal hematoma/contusion and left frontal subarachnoid hemorrhage.  Findings are consistent with resolving bleed. 2. No additional acute findings in the brain.    3. Nasal bone fracture again noted.       Dict readiness. Completed 8/30    Met   Goal #3 The patient will tolerate minced and moist consistency and mildly thick liquids without overt signs or symptoms of aspiration with 100 % accuracy over 1-2 session(s).   In Progress   Goal #4 The patient will tole

## (undated) NOTE — IP AVS SNAPSHOT
[facility]            (For Outpatient Use Only) Initial Admit Date: 3/9/2022   Inpt/Obs Admit Date: Inpt: 3/9/22 / Obs: 22   Discharge Date:    Hospital Acct:  [de-identified]   MRN: [de-identified]   CSN: [id number]   CEID: [id number]        ENCOUNTER  Patient Class: Inpatient Admitting Provider: Eldonna Felty, DO Unit: [address]   Hospital Service: Cardiac Telemetry Attending Provider: Ignacio Larson DO   Bed: 315-A   Visit Type:   Referring Physician: No ref. provider found Billing Flag:    Admit Diagnosis: Elevated troponin [R77.8]      PATIENT  Legal Name:   Karen Degroot   Legal Sex: Male  Gender ID:              Pref Name:    PCP:  Vinicius Cadena Home: [phone]   Address:  [place] [address] : 1935 (86 yrs) Mobile: [phone]         City/State/Zip: [facility] [place] [address] [facility] Marital:  Language: [address] [place] [address] Drive: [facility] SSN4: [id number] Zoroastrian: Hinduism - Visitation El*     Race: White Ethnicity: Non  Or [address]   Name Relationship Legal Guardian? Home Phone Work Phone Mobile Phone   1. JES Gudino  2. *No Contact Specified* Daughter            [phone]       GUARANTOR  Guarantor: Brandon Starksnette Meredith : 1935 Home Phone: [phone]   Address: [place] [address]  Sex: Male Work Phone:    City/State/Zip: [place] [facility] [address] [facility]   Rel. to Patient: Self Guarantor ID: [id number]   GUARANTOR EMPLOYER   Employer:  Status: RETIRED     COVERAGE  PRIMARY INSURANCE   Payor: MEDICARE Plan: MEDICARE PART A&B   Group Number:  Insurance Type: INDEMNITY   Subscriber Name: Juan Alvares : 1935   Subscriber ID: [id number] Pt Rel to Subscriber: Self   SECONDARY INSURANCE   Payor: Ben YEBOAH Plan: [place]   Group Number: [id number] Insurance Type: [address] Name: Juan Alvares : 1935   Subscriber ID: [id number] Pt Rel to Subscriber: SELF   TERTIARY INSURANCE Payor:  Plan:    Group Number:  Insurance Type:    Subscriber Name:  Subscriber :    Subscriber ID:  Pt Rel to Subscriber:    Hospital Account Financial Class: Medicare    2022

## (undated) NOTE — IP AVS SNAPSHOT
[facility]            (For Outpatient Use Only) Initial Admit Date: 8/28/2021   Inpt/Obs Admit Date: Inpt: 8/28/21 / Obs: N/A   Discharge Date:    Marry Sanabria:  [de-identified]   MRN: [de-identified]   CSN: [id number]   CEID: [id number]        YTN TERTIARY INSURANCE   Payor:  Plan:    Group Number:  Insurance Type:    Subscriber Name:  Subscriber :    Subscriber ID:  Pt Rel to Subscriber:    Hospital Account Financial Class: Medicare    2021